# Patient Record
Sex: FEMALE | Race: WHITE | NOT HISPANIC OR LATINO | Employment: FULL TIME | URBAN - METROPOLITAN AREA
[De-identification: names, ages, dates, MRNs, and addresses within clinical notes are randomized per-mention and may not be internally consistent; named-entity substitution may affect disease eponyms.]

---

## 2023-04-29 LAB
THYROTROPIN (MIU/L) IN SER/PLAS BY DETECTION LIMIT <= 0.05 MIU/L: 3.81 MIU/L (ref 0.44–3.98)
THYROXINE (T4) FREE (NG/DL) IN SER/PLAS: 1.13 NG/DL (ref 0.78–1.48)
TRIIODOTHYRONINE (T3) (NG/DL) IN SER/PLAS: 95 NG/DL (ref 60–200)

## 2023-05-12 LAB
CALCIDIOL (25 OH VITAMIN D3) (NG/ML) IN SER/PLAS: 51 NG/ML
THYROPEROXIDASE AB (IU/ML) IN SER/PLAS: <28 IU/ML

## 2023-09-09 PROBLEM — E04.1 THYROID NODULE: Status: ACTIVE | Noted: 2023-09-09

## 2023-09-09 PROBLEM — K21.9 GASTROESOPHAGEAL REFLUX DISEASE: Status: RESOLVED | Noted: 2023-09-09 | Resolved: 2023-09-09

## 2023-09-09 PROBLEM — E55.9 VITAMIN D DEFICIENCY: Status: ACTIVE | Noted: 2023-09-09

## 2023-09-09 PROBLEM — G47.00 INSOMNIA: Status: ACTIVE | Noted: 2023-09-09

## 2023-09-09 PROBLEM — J34.2 DEVIATED NASAL SEPTUM: Status: ACTIVE | Noted: 2023-09-09

## 2023-09-09 PROBLEM — R40.0 DAYTIME SOMNOLENCE: Status: ACTIVE | Noted: 2023-09-09

## 2023-09-09 PROBLEM — G47.33 OBSTRUCTIVE SLEEP APNEA OF ADULT: Status: ACTIVE | Noted: 2023-09-09

## 2023-09-09 PROBLEM — I10 HYPERTENSION: Status: ACTIVE | Noted: 2023-09-09

## 2023-09-09 PROBLEM — E78.5 HYPERLIPIDEMIA: Status: ACTIVE | Noted: 2023-09-09

## 2023-09-09 PROBLEM — R00.2 HEART PALPITATIONS: Status: ACTIVE | Noted: 2023-09-09

## 2023-09-09 PROBLEM — K21.9 GASTROESOPHAGEAL REFLUX DISEASE: Status: ACTIVE | Noted: 2023-09-09

## 2023-09-09 PROBLEM — R49.0 HOARSENESS: Status: ACTIVE | Noted: 2023-09-09

## 2023-09-09 PROBLEM — K21.9 GERD WITHOUT ESOPHAGITIS: Status: ACTIVE | Noted: 2023-09-09

## 2023-09-09 PROBLEM — I10 BENIGN ESSENTIAL HTN: Status: ACTIVE | Noted: 2023-09-09

## 2023-09-09 PROBLEM — F41.9 ANXIETY: Status: ACTIVE | Noted: 2023-09-09

## 2023-09-09 PROBLEM — Z78.0 POSTMENOPAUSAL ESTROGEN DEFICIENCY: Status: ACTIVE | Noted: 2023-09-09

## 2023-09-09 PROBLEM — E04.9 GOITER: Status: ACTIVE | Noted: 2023-09-09

## 2023-09-09 PROBLEM — R91.8 LUNG NODULES: Status: ACTIVE | Noted: 2023-09-09

## 2023-09-09 PROBLEM — N95.1 FEMALE CLIMACTERIC: Status: ACTIVE | Noted: 2023-09-09

## 2023-09-09 PROBLEM — R00.0 TACHYCARDIA: Status: ACTIVE | Noted: 2023-09-09

## 2023-09-09 PROBLEM — M85.88 OSTEOPENIA OF LUMBAR SPINE: Status: ACTIVE | Noted: 2023-09-09

## 2023-09-09 RX ORDER — LORAZEPAM 1 MG/1
1 TABLET ORAL EVERY 12 HOURS PRN
COMMUNITY

## 2023-09-09 RX ORDER — PAROXETINE HYDROCHLORIDE 20 MG/1
1 TABLET, FILM COATED ORAL DAILY
COMMUNITY
Start: 2019-02-04

## 2023-09-09 RX ORDER — ERGOCALCIFEROL 1.25 MG/1
1 CAPSULE ORAL
COMMUNITY
Start: 2019-02-05

## 2023-09-09 RX ORDER — CHOLECALCIFEROL (VITAMIN D3) 50 MCG
1 TABLET ORAL EVERY 24 HOURS
COMMUNITY

## 2023-09-09 RX ORDER — PAROXETINE 10 MG/1
1 TABLET, FILM COATED ORAL EVERY MORNING
COMMUNITY

## 2023-09-09 RX ORDER — IRBESARTAN 150 MG/1
150 TABLET ORAL DAILY
COMMUNITY
Start: 2023-06-08 | End: 2024-01-08

## 2023-09-09 RX ORDER — AMLODIPINE BESYLATE 2.5 MG/1
1 TABLET ORAL EVERY 24 HOURS
COMMUNITY
Start: 2020-07-30

## 2023-10-04 ENCOUNTER — ANCILLARY PROCEDURE (OUTPATIENT)
Dept: RADIOLOGY | Facility: CLINIC | Age: 63
End: 2023-10-04
Payer: COMMERCIAL

## 2023-10-04 DIAGNOSIS — M25.551 PAIN IN RIGHT HIP: ICD-10-CM

## 2023-10-04 PROCEDURE — 73502 X-RAY EXAM HIP UNI 2-3 VIEWS: CPT | Mod: RIGHT SIDE | Performed by: RADIOLOGY

## 2023-10-04 PROCEDURE — 73502 X-RAY EXAM HIP UNI 2-3 VIEWS: CPT | Mod: RT,FY

## 2023-10-12 ENCOUNTER — APPOINTMENT (OUTPATIENT)
Dept: CARDIOLOGY | Facility: CLINIC | Age: 63
End: 2023-10-12
Payer: COMMERCIAL

## 2023-11-25 ENCOUNTER — HOSPITAL ENCOUNTER (OUTPATIENT)
Dept: RADIOLOGY | Facility: EXTERNAL LOCATION | Age: 63
Discharge: HOME | End: 2023-11-25

## 2023-11-25 DIAGNOSIS — M25.572 PAIN IN LEFT ANKLE AND JOINTS OF LEFT FOOT: ICD-10-CM

## 2023-12-21 ENCOUNTER — APPOINTMENT (OUTPATIENT)
Dept: CARDIOLOGY | Facility: CLINIC | Age: 63
End: 2023-12-21
Payer: COMMERCIAL

## 2024-01-07 DIAGNOSIS — I10 HYPERTENSION, UNSPECIFIED TYPE: Primary | ICD-10-CM

## 2024-01-08 RX ORDER — IRBESARTAN 150 MG/1
150 TABLET ORAL DAILY
Qty: 30 TABLET | Refills: 11 | Status: SHIPPED | OUTPATIENT
Start: 2024-01-08 | End: 2024-04-19 | Stop reason: SDUPTHER

## 2024-01-18 ENCOUNTER — APPOINTMENT (OUTPATIENT)
Dept: CARDIOLOGY | Facility: CLINIC | Age: 64
End: 2024-01-18
Payer: COMMERCIAL

## 2024-04-19 ENCOUNTER — APPOINTMENT (OUTPATIENT)
Dept: PRIMARY CARE | Facility: CLINIC | Age: 64
End: 2024-04-19
Payer: MEDICARE

## 2024-04-19 DIAGNOSIS — I10 HYPERTENSION, UNSPECIFIED TYPE: ICD-10-CM

## 2024-04-19 RX ORDER — IRBESARTAN 150 MG/1
150 TABLET ORAL DAILY
Qty: 100 TABLET | Refills: 0 | Status: SHIPPED | OUTPATIENT
Start: 2024-04-19

## 2024-04-19 NOTE — TELEPHONE ENCOUNTER
Patient is scheduled for May but needs refills.  Irbesartan 150mg takes one a day.  #100 (per new insurance)  [facility]

## 2024-05-30 ENCOUNTER — APPOINTMENT (OUTPATIENT)
Dept: PRIMARY CARE | Facility: CLINIC | Age: 64
End: 2024-05-30
Payer: COMMERCIAL

## 2024-08-01 ENCOUNTER — APPOINTMENT (OUTPATIENT)
Dept: PRIMARY CARE | Facility: CLINIC | Age: 64
End: 2024-08-01
Payer: MEDICARE

## 2024-08-01 VITALS
SYSTOLIC BLOOD PRESSURE: 121 MMHG | OXYGEN SATURATION: 97 % | TEMPERATURE: 97.8 F | WEIGHT: 164.2 LBS | HEIGHT: 67 IN | DIASTOLIC BLOOD PRESSURE: 76 MMHG | HEART RATE: 82 BPM | BODY MASS INDEX: 25.77 KG/M2

## 2024-08-01 DIAGNOSIS — K44.9 HIATAL HERNIA: ICD-10-CM

## 2024-08-01 DIAGNOSIS — E55.9 VITAMIN D DEFICIENCY: ICD-10-CM

## 2024-08-01 DIAGNOSIS — I10 HYPERTENSION, UNSPECIFIED TYPE: ICD-10-CM

## 2024-08-01 DIAGNOSIS — K21.9 GASTROESOPHAGEAL REFLUX DISEASE, UNSPECIFIED WHETHER ESOPHAGITIS PRESENT: ICD-10-CM

## 2024-08-01 DIAGNOSIS — Z00.00 ENCOUNTER FOR PREVENTIVE HEALTH EXAMINATION: Primary | ICD-10-CM

## 2024-08-01 PROCEDURE — 3074F SYST BP LT 130 MM HG: CPT | Performed by: FAMILY MEDICINE

## 2024-08-01 PROCEDURE — 1159F MED LIST DOCD IN RCRD: CPT | Performed by: FAMILY MEDICINE

## 2024-08-01 PROCEDURE — 3078F DIAST BP <80 MM HG: CPT | Performed by: FAMILY MEDICINE

## 2024-08-01 PROCEDURE — 99397 PER PM REEVAL EST PAT 65+ YR: CPT | Performed by: FAMILY MEDICINE

## 2024-08-01 PROCEDURE — 1036F TOBACCO NON-USER: CPT | Performed by: FAMILY MEDICINE

## 2024-08-01 PROCEDURE — 3008F BODY MASS INDEX DOCD: CPT | Performed by: FAMILY MEDICINE

## 2024-08-01 PROCEDURE — 1126F AMNT PAIN NOTED NONE PRSNT: CPT | Performed by: FAMILY MEDICINE

## 2024-08-01 PROCEDURE — G0402 INITIAL PREVENTIVE EXAM: HCPCS | Performed by: FAMILY MEDICINE

## 2024-08-01 PROCEDURE — 1170F FXNL STATUS ASSESSED: CPT | Performed by: FAMILY MEDICINE

## 2024-08-01 RX ORDER — SUCRALFATE 1 G/1
1 TABLET ORAL
Qty: 120 TABLET | Refills: 2 | Status: SHIPPED | OUTPATIENT
Start: 2024-08-01 | End: 2024-10-30

## 2024-08-01 RX ORDER — PANTOPRAZOLE SODIUM 40 MG/1
40 TABLET, DELAYED RELEASE ORAL DAILY
Qty: 30 TABLET | Refills: 1 | Status: SHIPPED | OUTPATIENT
Start: 2024-08-01 | End: 2024-09-30

## 2024-08-01 RX ORDER — IRBESARTAN 150 MG/1
150 TABLET ORAL DAILY
Qty: 100 TABLET | Refills: 2 | Status: SHIPPED | OUTPATIENT
Start: 2024-08-01

## 2024-08-01 RX ORDER — PAROXETINE 30 MG/1
1 TABLET, FILM COATED ORAL
COMMUNITY
Start: 2024-07-11

## 2024-08-01 ASSESSMENT — COLUMBIA-SUICIDE SEVERITY RATING SCALE - C-SSRS
6. HAVE YOU EVER DONE ANYTHING, STARTED TO DO ANYTHING, OR PREPARED TO DO ANYTHING TO END YOUR LIFE?: NO
2. HAVE YOU ACTUALLY HAD ANY THOUGHTS OF KILLING YOURSELF?: NO
1. IN THE PAST MONTH, HAVE YOU WISHED YOU WERE DEAD OR WISHED YOU COULD GO TO SLEEP AND NOT WAKE UP?: NO

## 2024-08-01 ASSESSMENT — PATIENT HEALTH QUESTIONNAIRE - PHQ9
2. FEELING DOWN, DEPRESSED OR HOPELESS: NOT AT ALL
1. LITTLE INTEREST OR PLEASURE IN DOING THINGS: MORE THAN HALF THE DAYS
10. IF YOU CHECKED OFF ANY PROBLEMS, HOW DIFFICULT HAVE THESE PROBLEMS MADE IT FOR YOU TO DO YOUR WORK, TAKE CARE OF THINGS AT HOME, OR GET ALONG WITH OTHER PEOPLE: NOT DIFFICULT AT ALL
SUM OF ALL RESPONSES TO PHQ9 QUESTIONS 1 AND 2: 2

## 2024-08-01 ASSESSMENT — ACTIVITIES OF DAILY LIVING (ADL)
DOING_HOUSEWORK: INDEPENDENT
MANAGING_FINANCES: INDEPENDENT
TAKING_MEDICATION: INDEPENDENT
DRESSING: INDEPENDENT
GROCERY_SHOPPING: INDEPENDENT
BATHING: INDEPENDENT

## 2024-08-01 ASSESSMENT — ENCOUNTER SYMPTOMS
DEPRESSION: 0
OCCASIONAL FEELINGS OF UNSTEADINESS: 0
LOSS OF SENSATION IN FEET: 0

## 2024-08-01 ASSESSMENT — PAIN SCALES - GENERAL: PAINLEVEL: 0-NO PAIN

## 2024-08-01 NOTE — ASSESSMENT & PLAN NOTE
Will prescribe protonix 40mg and carafate 1g as her symptoms have been progressively worsening. Advised her to let us know if symptoms do not improve.

## 2024-08-01 NOTE — PROGRESS NOTES
Subjective   Reason for Visit: Radha Gold is an 64 y.o. female here for a medicare wellness exam (Blood work porders) and Med Refill.     Past Medical, Surgical, and Family History reviewed and updated in chart.    Reviewed all medications by prescribing practitioner or clinical pharmacist (such as prescriptions, OTCs, herbal therapies and supplements) and documented in the medical record.    HPI  1.  Medicare wellness/physical exam.  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 GERD, hiatal hernia  Worsening reflux  Was recommended to have EGD 3 years ago  Takes nexium OTC    3. Groin pain  Right groin pain for 3 years  Quick movements cause sharp pain  Has seen Dr. Evans in the past     4. HLD  Dr. Joiner  Previous lipid panel with  and Tchol 295  CT calcium score in 2019 was 0    5. Thyroid nodules  Dr. Evans  Following up in September     Review of Systems  All pertinent positive symptoms are included in the history of present illness.    All other systems have been reviewed and are negative and noncontributory to this patient's current ailments.    History reviewed. No pertinent past medical history.  Past Surgical History:   Procedure Laterality Date    OTHER SURGICAL HISTORY  01/17/2019    Shoulder surgery     Social History     Tobacco Use    Smoking status: Never    Smokeless tobacco: Never   Vaping Use    Vaping status: Never Used   Substance Use Topics    Alcohol use: Not Currently    Drug use: Never     Family History   Problem Relation Name Age of Onset    Hyperlipidemia Mother      Coronary artery disease Father      Hyperlipidemia Sister      Hyperlipidemia Brother       Allergies   Allergen Reactions    Adhesive Tape-Silicones Rash     Immunization History   Administered Date(s) Administered    Flu vaccine (IIV4), preservative free *Check age/dose* 09/12/2020, 12/07/2022     "Flu vaccine, quadrivalent, recombinant, preservative free, adult (FLUBLOK) 09/13/2020    Influenza, injectable, MDCK, quadrivalent 10/29/2019    Moderna SARS-CoV-2 Vaccination 01/21/2021, 01/22/2021, 02/19/2021, 11/01/2021     Current Outpatient Medications   Medication Instructions    cholecalciferol (Vitamin D-3) 50 MCG (2000 UT) tablet 1 tablet, oral, Every 24 hours    cyanocobalamin, vitamin B-12, (VITAMIN B-12 ORAL) No dose, route, or frequency recorded.    ergocalciferol (Vitamin D-2) 1.25 MG (78810 UT) capsule 1 capsule, oral, Once Weekly    irbesartan (AVAPRO) 150 mg, oral, Daily    LORazepam (ATIVAN) 1 mg, oral, Every 12 hours PRN    pantoprazole (PROTONIX) 40 mg, oral, Daily, Do not crush, chew, or split.    PARoxetine (PaxiL) 10 mg tablet 1 tablet, oral, Every morning    PARoxetine (Paxil) 20 mg tablet 1 tablet, oral, Daily    PARoxetine (Paxil) 30 mg tablet 1 tablet, oral, Daily (0630)    sucralfate (CARAFATE) 1 g, oral, 4 times daily before meals and nightly       Patient Self Assessment of Health Status  Patient Self Assessment: Good    Nutrition and Exercise  Current Diet: Unhealthy Diet  Adequate Fluid Intake: Yes  Caffeine: Yes  Exercise Frequency: No Exercise    Functional Ability/Level of Safety  Cognitive Impairment Observed: No cognitive impairment observed  Cognitive Impairment Reported: No cognitive impairment reported by patient or family    Home Safety Risk Factors: None    Objective   Visit Vitals  /76   Pulse 82   Temp 36.6 °C (97.8 °F)   Ht 1.689 m (5' 6.5\")   Wt 74.5 kg (164 lb 3.2 oz)   SpO2 97%   BMI 26.11 kg/m²   Smoking Status Never   BSA 1.87 m²      No visits with results within 1 Month(s) from this visit.   Latest known visit with results is:   [facility] Encounter on 06/07/2023   Component Date Value    CONVERTED FINAL DIAGNOSIS 06/07/2023                      Value:  1. THYROID GLAND, RIGHT LOBE, FINE-NEEDLE ASPIRATION (1 THIN PREP AND  CELL BLOCK):    ADEQUACY:     ADEQUATE " FOR DIAGNOSIS    CATEGORY:     BENIGN             (BETHESDA SYSTEM FOR THYROID CYTOLOGY CATEGORY 2)    COMMENT:     The specimen includes relatively uniform follicular cells,  some arranged in spherules.    2. THYROID GLAND, LEFT ISTHMUS, FINE-NEEDLE ASPIRATION (1 THIN PREP AND  CELL BLOCK):    ADEQUACY:     UNSATISFACTORY FOR DIAGNOSIS    CATEGORY:     NON-DIAGNOSTIC / UNSATISFACTORY FOR DIAGNOSIS            (BETHESDA SYSTEM FOR THYROID CYTOLOGY CATEGORY 1)    COMMENT: The specimen is unsatisfactory for interpretation due to  insufficient cellularity.      CONSULTATION WITH CONCURRENCE: JOSEPH LUNA M.D.  06/15/2023 14:36  [facility] - 06/08/2023      CONVERTED CLINICAL DIAGN* 06/07/2023 E04.1 - Thyroid nodule, Right and Left isthmus     CONVERTED GROSS DESCRIPT* 06/07/2023                      Value:1. The specimen consists of ~ 30 ml of blood-tinged CytoLyt solution.   One ThinPrep slide and one cell block are prepared. Also received is ~  10 ml FNAprotect solution, acquired and held for possible molecular  testing.    2. The specimen consists of ~ 30 ml of clear CytoLyt solution.  One  ThinPrep slide and one cell block are prepared. Also received is ~ 10  ml FNAprotect solution, acquired and held for possible molecular  testing.    A portion of the technical component was performed at [facility], [address] [place] [address].  CLIA number  [id number].  This information is included on the report as part of a  CLIA requirement.          CONVERTED MICROSCOPIC DE* 06/07/2023                      Value:Slides examined; description omitted.    The above diagnosis was rendered at [facility], [address] [place] [address]. [address], [address] [place] [address], Alice Kendall  & Fabrizio CLIA number [id number].  This information is included on  the report as part of a CLIA requirement.       The 10-year ASCVD risk score (Edvin DK, et al., 2019) is:  7.8%    Values used to calculate the score:      Age: 64 years      Sex: Female      Is Non- : No      Diabetic: No      Tobacco smoker: No      Systolic Blood Pressure: 121 mmHg      Is BP treated: Yes      HDL Cholesterol: 61 MG/DL      Total Cholesterol: 295 MG/DL    Physical Exam  CONSTITUTIONAL - well nourished, well developed, looks like stated age, in no acute distress, not ill-appearing, and not tired appearing  SKIN - normal skin color and pigmentation, normal skin turgor without rash, lesions, or nodules visualized  HEAD - no trauma, normocephalic  EYES - pupils are equal and reactive to light, extraocular muscles are intact, and normal external exam  ENT - TM's intact, no injection, no signs of infection, uvula midline, normal tongue movement and throat normal, no exudate, nasal passage without discharge and patent  NECK - supple without rigidity, no neck mass was observed, no thyromegaly or thyroid nodules  CHEST - clear to auscultation, no wheezing, no crackles and no rales, good effort  CARDIAC - regular rate and regular rhythm, no skipped beats, no murmur  ABDOMEN - no organomegaly, soft, nontender, nondistended, normal bowel sounds  EXTREMITIES - no obvious or evident edema, no obvious or evident deformities  NEUROLOGICAL - alert, oriented and no focal signs  PSYCHIATRIC - alert, pleasant and cordial, age-appropriate  IMMUNOLOGIC - no cervical lymphadenopathy    Assessment/Plan   Problem List Items Addressed This Visit       Gastroesophageal reflux disease    Current Assessment & Plan     Will prescribe protonix 40mg and carafate 1g as her symptoms have been progressively worsening. Advised her to let us know if symptoms do not improve.          Relevant Medications    pantoprazole (ProtoNix) 40 mg EC tablet    sucralfate (Carafate) 1 gram tablet    Hypertension    Current Assessment & Plan     Stable, continue medication.         Relevant Medications    irbesartan (Avapro)  150 mg tablet    Vitamin D deficiency    Current Assessment & Plan     History of vit d deficiency. Will follow up with results.          Relevant Orders    Vitamin D 25-Hydroxy,Total (for eval of Vitamin D levels)    Encounter for preventive health examination - Primary    Current Assessment & Plan     Complete history and physical examination was performed  Mammogram due, but patient will defer at this time and let us know when she is ready to schedule  Colonoscopy due, patient will see gen surg regarding hiatal hernia and hopefully have colonoscopy done at same time as EGD  We will notify of test results once available and make treatment recommendations accordingly           Relevant Orders    CBC    Comprehensive Metabolic Panel    Hemoglobin A1C    Lipid Panel    TSH with reflex to Free T4 if abnormal    Vitamin D 25-Hydroxy,Total (for eval of Vitamin D levels)    Hiatal hernia    Current Assessment & Plan     Has previously seen GI who recommended EGD and colonoscopy, but patient was unable to get this set up due to life circumstances. We will place a referral to Dr. Thorpe as her symptoms are progressing.          Relevant Medications    pantoprazole (ProtoNix) 40 mg EC tablet    Other Relevant Orders    Referral to General Surgery     Other Visit Diagnoses       BMI 26.0-26.9,adult        Relevant Orders    CBC          Patient Care Team:  Adiel Griffith DO as PCP - General  Adiel Griffith DO as PCP - [employer] PCP

## 2024-08-01 NOTE — ASSESSMENT & PLAN NOTE
Has previously seen GI who recommended EGD and colonoscopy, but patient was unable to get this set up due to life circumstances. We will place a referral to Dr. Thorpe as her symptoms are progressing.

## 2024-08-01 NOTE — ASSESSMENT & PLAN NOTE
Complete history and physical examination was performed  Mammogram due, but patient will defer at this time and let us know when she is ready to schedule  Colonoscopy due, patient will see gen surg regarding hiatal hernia and hopefully have colonoscopy done at same time as EGD  We will notify of test results once available and make treatment recommendations accordingly

## 2024-08-02 NOTE — PROGRESS NOTES
I reviewed and examined the patient. I was present for the key exam elements, and I fully participated in the patient's care. I discussed the management of the care with the resident. I have personally reviewed the pertinent labs and imaging, as well as recent notes, with the patient. I have reviewed the note above and agree with the resident's medical decision making as documented in the resident's note, in addition to the following comments / findings:     Agree with the rest of the plan outlined below by resident physician. No red flags.      The patient understands and agrees to the assessment and plan of care. Patient has also agreed to follow up and comply with the treatment and evaluation as recommended today. Patient was instructed to call the office at [phone] should questions arise regarding their treatment or care.     Adiel Griffith DO, FAOASM  Family Medicine   [address], [address]  [place] [address]     Adiel Griffith DO

## 2024-08-12 ENCOUNTER — TELEPHONE (OUTPATIENT)
Dept: PRIMARY CARE | Facility: CLINIC | Age: 64
End: 2024-08-12
Payer: MEDICARE

## 2024-08-12 DIAGNOSIS — Z12.11 COLON CANCER SCREENING: Primary | ICD-10-CM

## 2024-08-12 NOTE — TELEPHONE ENCOUNTER
Patient called stated she looked into Dr. Thorpe and she only does certain procedures out of [facility] the rest are out of [facility].  Can she have  a new referral put in for someone else.?

## 2024-08-13 DIAGNOSIS — K44.9 HIATAL HERNIA: Primary | ICD-10-CM

## 2024-08-19 ENCOUNTER — LAB (OUTPATIENT)
Dept: LAB | Facility: LAB | Age: 64
End: 2024-08-19
Payer: MEDICARE

## 2024-08-19 DIAGNOSIS — E55.9 VITAMIN D DEFICIENCY: ICD-10-CM

## 2024-08-19 DIAGNOSIS — Z00.00 ENCOUNTER FOR PREVENTIVE HEALTH EXAMINATION: ICD-10-CM

## 2024-08-19 LAB
25(OH)D3 SERPL-MCNC: 33 NG/ML (ref 31–100)
ALBUMIN SERPL-MCNC: 4.5 G/DL (ref 3.5–5)
ALP BLD-CCNC: 116 U/L (ref 35–125)
ALT SERPL-CCNC: 28 U/L (ref 5–40)
ANION GAP SERPL CALC-SCNC: 12 MMOL/L
AST SERPL-CCNC: 25 U/L (ref 5–40)
BILIRUB SERPL-MCNC: 0.5 MG/DL (ref 0.1–1.2)
BUN SERPL-MCNC: 20 MG/DL (ref 8–25)
CALCIUM SERPL-MCNC: 9.8 MG/DL (ref 8.5–10.4)
CHLORIDE SERPL-SCNC: 104 MMOL/L (ref 97–107)
CHOLEST SERPL-MCNC: 340 MG/DL (ref 133–200)
CHOLEST/HDLC SERPL: 6.3 {RATIO}
CO2 SERPL-SCNC: 27 MMOL/L (ref 24–31)
CREAT SERPL-MCNC: 1 MG/DL (ref 0.4–1.6)
EGFRCR SERPLBLD CKD-EPI 2021: 63 ML/MIN/1.73M*2
ERYTHROCYTE [DISTWIDTH] IN BLOOD BY AUTOMATED COUNT: 13.4 % (ref 11.5–14.5)
EST. AVERAGE GLUCOSE BLD GHB EST-MCNC: 128 MG/DL
GLUCOSE SERPL-MCNC: 95 MG/DL (ref 65–99)
HBA1C MFR BLD: 6.1 %
HCT VFR BLD AUTO: 43.9 % (ref 36–46)
HDLC SERPL-MCNC: 54 MG/DL
HGB BLD-MCNC: 14.4 G/DL (ref 12–16)
LDLC SERPL CALC-MCNC: 256 MG/DL (ref 65–130)
MCH RBC QN AUTO: 28.9 PG (ref 26–34)
MCHC RBC AUTO-ENTMCNC: 32.8 G/DL (ref 32–36)
MCV RBC AUTO: 88 FL (ref 80–100)
NRBC BLD-RTO: 0 /100 WBCS (ref 0–0)
PLATELET # BLD AUTO: 290 X10*3/UL (ref 150–450)
POTASSIUM SERPL-SCNC: 4.7 MMOL/L (ref 3.4–5.1)
PROT SERPL-MCNC: 7.1 G/DL (ref 5.9–7.9)
RBC # BLD AUTO: 4.99 X10*6/UL (ref 4–5.2)
SODIUM SERPL-SCNC: 143 MMOL/L (ref 133–145)
TRIGL SERPL-MCNC: 150 MG/DL (ref 40–150)
TSH SERPL DL<=0.05 MIU/L-ACNC: 3.05 MIU/L (ref 0.27–4.2)
WBC # BLD AUTO: 6.5 X10*3/UL (ref 4.4–11.3)

## 2024-08-19 PROCEDURE — 83036 HEMOGLOBIN GLYCOSYLATED A1C: CPT

## 2024-08-19 PROCEDURE — 82306 VITAMIN D 25 HYDROXY: CPT

## 2024-08-19 PROCEDURE — 80053 COMPREHEN METABOLIC PANEL: CPT

## 2024-08-19 PROCEDURE — 36415 COLL VENOUS BLD VENIPUNCTURE: CPT

## 2024-08-19 PROCEDURE — 85027 COMPLETE CBC AUTOMATED: CPT

## 2024-08-19 PROCEDURE — 80061 LIPID PANEL: CPT

## 2024-08-19 PROCEDURE — 84443 ASSAY THYROID STIM HORMONE: CPT

## 2024-08-29 ENCOUNTER — TELEPHONE (OUTPATIENT)
Dept: PRIMARY CARE | Facility: CLINIC | Age: 64
End: 2024-08-29
Payer: MEDICARE

## 2024-08-29 NOTE — TELEPHONE ENCOUNTER
Patient did see her lab results. Had some questions about results and asked about Ozempic.  I did tell her some of those meds aren't covered and she asked when you prescribe.  I did tell her I wasn't sure how much overweight one had to be for you to prescribe.

## 2024-09-06 ENCOUNTER — APPOINTMENT (OUTPATIENT)
Dept: SURGERY | Facility: CLINIC | Age: 64
End: 2024-09-06
Payer: MEDICARE

## 2024-09-06 VITALS
WEIGHT: 166.1 LBS | HEIGHT: 66 IN | BODY MASS INDEX: 26.69 KG/M2 | SYSTOLIC BLOOD PRESSURE: 147 MMHG | HEART RATE: 74 BPM | DIASTOLIC BLOOD PRESSURE: 82 MMHG | OXYGEN SATURATION: 97 %

## 2024-09-06 DIAGNOSIS — K44.9 HIATAL HERNIA: Primary | ICD-10-CM

## 2024-09-06 PROCEDURE — 3077F SYST BP >= 140 MM HG: CPT | Performed by: SURGERY

## 2024-09-06 PROCEDURE — 99204 OFFICE O/P NEW MOD 45 MIN: CPT | Performed by: SURGERY

## 2024-09-06 PROCEDURE — 3008F BODY MASS INDEX DOCD: CPT | Performed by: SURGERY

## 2024-09-06 PROCEDURE — 3079F DIAST BP 80-89 MM HG: CPT | Performed by: SURGERY

## 2024-09-06 NOTE — PROGRESS NOTES
General Surgery History and Physical    Referring Provider:  DO Nacho Matt Louis D, DO     Chief Complaint:  Hiatal hernia    History of Present Illness:  This is a 64 y.o. female who presents with complaints of a hiatal hernia.  She reports that a few years ago she had some imaging done at an outside institution for spine issues that demonstrated a hiatal hernia.  However, at that time, she had only very minimal intermittent heartburn symptoms.  However, over the past year or 2, she has had increasing heartburn.  Omeprazole was no longer working for her.  She also reports significant nearly daily burning pain at the base of her sternum, but deep inside.  She also reports that occasionally it feels like food gets stuck in the same location.  She is often waking up with a sensation of needing to vomit.  She does still drink 2 to 3 cups of coffee a day.  She also eats a large meal in the evening relatively close to bedtime.    Past Medical History:  Hyperlipidemia  Hypertension  Gastroesophageal reflux disease  Anxiety    Past Surgical History:  Shoulder surgery  Open reduction internal fixation of wrist  Knee surgery x 2    Medications:  Carafate  Pantoprazole  Multivitamins  Irbesartan  Lorazepam  Paxil    Allergies:  Tape    Family History:  Hyperlipidemia  Coronary artery disease  Kidney disease  Multiple sclerosis  Myocardial infarction    Social History:  Single.  Retired.  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  Denies tobacco, alcohol, and illicits.       Review of Systems:  A complete 12 point review of systems was performed and is negative except as noted in the history of present illness.      Vital Signs:  Vitals:    09/06/24 1114   BP: 147/82   Pulse: 74   SpO2: 97%          Physical Exam:  General: No acute distress. Sitting up in bed.   Neuro: Alert and oriented ×3. Follows commands.  Head: Atraumatic  Eyes: Pupils equal reactive to light. Extraocular motions intact.  Ears: Hears normal speaking  voice.  Mouth, Nose, Throat: Mucous membranes moist.  Normal dentition.  Neck: Supple. No appreciable masses.  Chest: No crepitus.  No appreciable scars.  Heart: Regular rate and rhythm.  Lung: Clear to auscultation bilaterally.  Vascular: No carotid bruits.  Palpable radial pulses bilaterally.  Abdomen: Soft. Nondistended. Nontender.  No appreciable hernias or scars.  Musculoskeletal: Moves all extremities.  Normal range of motion.  Lymphatic: No palpable lymph nodes.  Skin: No rashes or lesions.  Psychological: Normal affect      Laboratory Values:  CBC:   Lab Results   Component Value Date    WBC 6.5 08/19/2024    RBC 4.99 08/19/2024    HGB 14.4 08/19/2024    HCT 43.9 08/19/2024     08/19/2024       RFP:   Lab Results   Component Value Date     08/19/2024    K 4.7 08/19/2024     08/19/2024    CO2 27 08/19/2024    BUN 20 08/19/2024    CREATININE 1.00 08/19/2024    CALCIUM 9.8 08/19/2024        LFTs:   Lab Results   Component Value Date    PROT 7.1 08/19/2024    ALBUMIN 4.5 08/19/2024    BILITOT 0.5 08/19/2024    ALKPHOS 116 08/19/2024    AST 25 08/19/2024    ALT 28 08/19/2024            Imaging:   None      Assessment:  This is a 64 y.o. female who presents with gastroesophageal reflux disease with reports of a prior unrelated image demonstrating a hiatal hernia.  We discussed that her symptoms are very classic for a hiatal hernia with gastroesophageal reflux disease and some dysphagia.  We discussed that I recommend treating the gastroesophageal reflux disease aggressively, which she has already started.  We discussed that I recommend increasing the dose of the pantoprazole to 40 mg twice daily.  We discussed that we should also proceed with workup of the hiatal hernia.      Plan:  -- EGD with [facility] while being off of the pantoprazole for 1 week prior  -- EMOT  -- Esophagram  -- Avoid caffeine  -- Small frequent meals  -- Be upright for at least 2 hours after eating  -- Finish out the course of  Carafate  -- Increase pantoprazole to 40 mg twice daily  -- Follow-up with me after testing to potentially discuss a laparoscopic hiatal hernia repair with toupee fundoplication.      Saman Anaya MD  General Surgery  Office: [phone]  Fax:     [phone]  11:19 AM   09/06/24

## 2024-09-16 ENCOUNTER — TELEPHONE (OUTPATIENT)
Dept: PRIMARY CARE | Facility: CLINIC | Age: 64
End: 2024-09-16
Payer: MEDICARE

## 2024-09-16 NOTE — TELEPHONE ENCOUNTER
If patient is having fevers/chills, N/V, abdominal pain, diarrhea, or bloody stools, or malaise please advise her to immediately go to the emergency room.

## 2024-09-20 ENCOUNTER — OFFICE VISIT (OUTPATIENT)
Dept: PRIMARY CARE | Facility: CLINIC | Age: 64
End: 2024-09-20
Payer: MEDICARE

## 2024-09-20 VITALS
HEIGHT: 66 IN | SYSTOLIC BLOOD PRESSURE: 118 MMHG | DIASTOLIC BLOOD PRESSURE: 78 MMHG | BODY MASS INDEX: 26.36 KG/M2 | WEIGHT: 164 LBS | OXYGEN SATURATION: 99 % | HEART RATE: 71 BPM

## 2024-09-20 DIAGNOSIS — K21.9 GASTROESOPHAGEAL REFLUX DISEASE, UNSPECIFIED WHETHER ESOPHAGITIS PRESENT: ICD-10-CM

## 2024-09-20 DIAGNOSIS — Z71.85 IMMUNIZATION COUNSELING: ICD-10-CM

## 2024-09-20 DIAGNOSIS — R10.32 LLQ ABDOMINAL PAIN: Primary | ICD-10-CM

## 2024-09-20 DIAGNOSIS — K44.9 HIATAL HERNIA: ICD-10-CM

## 2024-09-20 PROCEDURE — 99214 OFFICE O/P EST MOD 30 MIN: CPT | Performed by: FAMILY MEDICINE

## 2024-09-20 PROCEDURE — 1125F AMNT PAIN NOTED PAIN PRSNT: CPT | Performed by: FAMILY MEDICINE

## 2024-09-20 PROCEDURE — 1036F TOBACCO NON-USER: CPT | Performed by: FAMILY MEDICINE

## 2024-09-20 PROCEDURE — 3078F DIAST BP <80 MM HG: CPT | Performed by: FAMILY MEDICINE

## 2024-09-20 PROCEDURE — 3008F BODY MASS INDEX DOCD: CPT | Performed by: FAMILY MEDICINE

## 2024-09-20 PROCEDURE — 3074F SYST BP LT 130 MM HG: CPT | Performed by: FAMILY MEDICINE

## 2024-09-20 PROCEDURE — 1159F MED LIST DOCD IN RCRD: CPT | Performed by: FAMILY MEDICINE

## 2024-09-20 RX ORDER — PANTOPRAZOLE SODIUM 40 MG/1
40 TABLET, DELAYED RELEASE ORAL 2 TIMES DAILY
Qty: 180 TABLET | Refills: 1 | Status: SHIPPED | OUTPATIENT
Start: 2024-09-20 | End: 2025-03-19

## 2024-09-20 ASSESSMENT — ENCOUNTER SYMPTOMS
OCCASIONAL FEELINGS OF UNSTEADINESS: 0
LOSS OF SENSATION IN FEET: 0
DEPRESSION: 0

## 2024-09-20 ASSESSMENT — PATIENT HEALTH QUESTIONNAIRE - PHQ9
1. LITTLE INTEREST OR PLEASURE IN DOING THINGS: NOT AT ALL
2. FEELING DOWN, DEPRESSED OR HOPELESS: NOT AT ALL
SUM OF ALL RESPONSES TO PHQ9 QUESTIONS 1 AND 2: 0

## 2024-09-20 ASSESSMENT — COLUMBIA-SUICIDE SEVERITY RATING SCALE - C-SSRS
2. HAVE YOU ACTUALLY HAD ANY THOUGHTS OF KILLING YOURSELF?: NO
1. IN THE PAST MONTH, HAVE YOU WISHED YOU WERE DEAD OR WISHED YOU COULD GO TO SLEEP AND NOT WAKE UP?: NO
6. HAVE YOU EVER DONE ANYTHING, STARTED TO DO ANYTHING, OR PREPARED TO DO ANYTHING TO END YOUR LIFE?: NO

## 2024-09-20 ASSESSMENT — PAIN SCALES - GENERAL: PAINLEVEL: 1

## 2024-09-20 NOTE — PROGRESS NOTES
Subjective   Patient ID: Radha Gold is a 64 y.o. female who presents for Diverticulitis.    Past Medical, Surgical, and Family History reviewed and updated in chart.    Reviewed all medications by prescribing practitioner or clinical pharmacist (such as prescriptions, OTCs, herbal therapies and supplements) and documented in the medical record.    HPI  1.  LLQ abdominal pain  Radha is a 64-year-old female who presents with a complaint of left lower quadrant abdominal pain. The initial episode of pain occurred three months ago, characterized by sharp pain that lasted a couple of hours and resolved spontaneously. Most recently, she experienced severe left lower quadrant pain on Saturday, lasting six hours, which nearly prompted a visit to the ER.    The patient denies any episodes of constipation, diarrhea, or hematochezia. She did report some nausea and vomiting during this recent episode. The pain does not seem to be exacerbated by movement or food.    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 She has experienced intermittent diarrhea over the past year and reports feeling like she is straining more than usual during bowel movements. Increased intra-abdominal pressure does not exacerbate her current pain. At present, she describes a dull discomfort in the left lower quadrant but no acute pain.      Review of Systems  All pertinent positive symptoms are included in the history of present illness.    All other systems have been reviewed and are negative and noncontributory to this patient's current ailments.    History reviewed. No pertinent past medical history.  Past Surgical History:   Procedure Laterality Date    OTHER SURGICAL HISTORY  01/17/2019    Shoulder surgery     Social History     Tobacco Use    Smoking status: Never    Smokeless tobacco: Never   Vaping Use    Vaping status: Never Used   Substance Use Topics    Alcohol use: Not Currently  "   Drug use: Never     Family History   Problem Relation Name Age of Onset    Hyperlipidemia Mother      Coronary artery disease Father      Hyperlipidemia Sister      Hyperlipidemia Brother       Immunization History   Administered Date(s) Administered    Flu vaccine (IIV4), preservative free *Check age/dose* 09/12/2020, 12/07/2022    Flu vaccine, quadrivalent, recombinant, preservative free, adult (FLUBLOK) 09/13/2020    Influenza, injectable, MDCK, quadrivalent 10/29/2019    Moderna SARS-CoV-2 Vaccination 01/21/2021, 01/22/2021, 02/19/2021, 11/01/2021     Current Outpatient Medications   Medication Instructions    cholecalciferol (Vitamin D-3) 50 MCG (2000 UT) tablet 1 tablet, oral, Every 24 hours    cyanocobalamin, vitamin B-12, (VITAMIN B-12 ORAL) No dose, route, or frequency recorded.    ergocalciferol (Vitamin D-2) 1.25 MG (72005 UT) capsule 1 capsule, oral, Once Weekly    irbesartan (AVAPRO) 150 mg, oral, Daily    LORazepam (ATIVAN) 1 mg, oral, Every 12 hours PRN    pantoprazole (PROTONIX) 40 mg, oral, 2 times daily, Do not crush, chew, or split. / 2 tablets daily    PARoxetine (PaxiL) 10 mg tablet 1 tablet, oral, Every morning    PARoxetine (Paxil) 20 mg tablet 1 tablet, oral, Daily    PARoxetine (Paxil) 30 mg tablet 1 tablet, oral, Daily (0630)    sucralfate (CARAFATE) 1 g, oral, 4 times daily before meals and nightly     Allergies   Allergen Reactions    Adhesive Tape-Silicones Rash       Objective   Vitals:    09/20/24 1044   BP: 118/78   Pulse: 71   SpO2: 99%   Weight: 74.4 kg (164 lb)   Height: 1.676 m (5' 6\")     Body mass index is 26.47 kg/m².    BP Readings from Last 3 Encounters:   09/20/24 118/78   09/06/24 147/82   08/01/24 121/76      Wt Readings from Last 3 Encounters:   09/20/24 74.4 kg (164 lb)   09/06/24 75.3 kg (166 lb 1.6 oz)   08/01/24 74.5 kg (164 lb 3.2 oz)        No visits with results within 1 Month(s) from this visit.   Latest known visit with results is:   Lab on 08/19/2024 "   Component Date Value    WBC 08/19/2024 6.5     nRBC 08/19/2024 0.0     RBC 08/19/2024 4.99     Hemoglobin 08/19/2024 14.4     Hematocrit 08/19/2024 43.9     MCV 08/19/2024 88     MCH 08/19/2024 28.9     MCHC 08/19/2024 32.8     RDW 08/19/2024 13.4     Platelets 08/19/2024 290     Glucose 08/19/2024 95     Sodium 08/19/2024 143     Potassium 08/19/2024 4.7     Chloride 08/19/2024 104     Bicarbonate 08/19/2024 27     Urea Nitrogen 08/19/2024 20     Creatinine 08/19/2024 1.00     eGFR 08/19/2024 63     Calcium 08/19/2024 9.8     Albumin 08/19/2024 4.5     Alkaline Phosphatase 08/19/2024 116     Total Protein 08/19/2024 7.1     AST 08/19/2024 25     Bilirubin, Total 08/19/2024 0.5     ALT 08/19/2024 28     Anion Gap 08/19/2024 12     Hemoglobin A1C 08/19/2024 6.1 (H)     Estimated Average Glucose 08/19/2024 128     Cholesterol 08/19/2024 340 (H)     HDL-Cholesterol 08/19/2024 54.0     Cholesterol/HDL Ratio 08/19/2024 6.3     LDL Calculated 08/19/2024 256 (H)     Triglycerides 08/19/2024 150     Thyroid Stimulating Horm* 08/19/2024 3.05     Vitamin D, 25-Hydroxy, T* 08/19/2024 33      Physical Exam  CONSTITUTIONAL - well nourished, well developed, looks like stated age, in no acute distress, not ill-appearing, and not tired appearing  SKIN - normal skin color and pigmentation, normal skin turgor without rash, lesions, or nodules visualized  HEAD - no trauma, normocephalic  EYES - extraocular muscles are intact, and normal external exam  CHEST - good effort  CARDIAC - regular rate and regular rhythm, no skipped beats, no murmur  ABDOMEN - Mild TTP LLQ. no organomegaly, soft, non-distended, normal bowel sounds, no guarding/rebound/rigidity, negative McBurney sign and negative Landa sign  EXTREMITIES - no obvious or evident edema, no obvious or evident deformities  NEUROLOGICAL - alert, oriented and no focal signs  PSYCHIATRIC - alert, pleasant and cordial, age-appropriate  IMMUNOLOGIC - no cervical  lymphadenopathy    Assessment/Plan   I will follow up with imaging results. No red flags.     Problem List Items Addressed This Visit       Gastroesophageal reflux disease    Relevant Medications    pantoprazole (ProtoNix) 40 mg EC tablet    Hiatal hernia    Relevant Medications    pantoprazole (ProtoNix) 40 mg EC tablet    LLQ abdominal pain - Primary     Radha presents today with left lower quadrant abdominal pain. History and exam are concerning for diverticulitis vs hernia. Ovarian torsion is also in the differential, though I consider it less likely. Can also be an abdominal muscle strain.    I will order a CT scan of the abdomen and pelvis to rule out any abdominal or pelvic pathology. Additionally, the patient is scheduling a colonoscopy in the next 1-2 months.    I will follow up with the patient once the imaging results are available.         Relevant Orders    Comprehensive metabolic panel    CBC and Auto Differential    Amylase    Lipase    CT abdomen pelvis w IV contrast     Other Visit Diagnoses       Immunization counseling              Patient was staffed with attending physician Dr. Hicks.  Rach Zee, DO  Family Medicine, PGY-3

## 2024-09-20 NOTE — ASSESSMENT & PLAN NOTE
Radha presents today with left lower quadrant abdominal pain. History and exam are concerning for diverticulitis vs hernia. Ovarian torsion is also in the differential, though I consider it less likely. Can also be an abdominal muscle strain.    I will order a CT scan of the abdomen and pelvis to rule out any abdominal or pelvic pathology. Additionally, the patient is scheduling a colonoscopy in the next 1-2 months.    I will follow up with the patient once the imaging results are available.

## 2024-09-24 ENCOUNTER — LAB (OUTPATIENT)
Dept: LAB | Facility: LAB | Age: 64
End: 2024-09-24
Payer: MEDICARE

## 2024-09-24 DIAGNOSIS — R10.32 LLQ ABDOMINAL PAIN: ICD-10-CM

## 2024-09-24 LAB
ALBUMIN SERPL BCP-MCNC: 4.5 G/DL (ref 3.4–5)
ALP SERPL-CCNC: 87 U/L (ref 33–136)
ALT SERPL W P-5'-P-CCNC: 20 U/L (ref 7–45)
AMYLASE SERPL-CCNC: 72 U/L (ref 29–103)
ANION GAP SERPL CALCULATED.3IONS-SCNC: 11 MMOL/L (ref 10–20)
AST SERPL W P-5'-P-CCNC: 19 U/L (ref 9–39)
BASOPHILS # BLD AUTO: 0.08 X10*3/UL (ref 0–0.1)
BASOPHILS NFR BLD AUTO: 1.4 %
BILIRUB SERPL-MCNC: 0.7 MG/DL (ref 0–1.2)
BUN SERPL-MCNC: 18 MG/DL (ref 6–23)
CALCIUM SERPL-MCNC: 9.5 MG/DL (ref 8.6–10.3)
CHLORIDE SERPL-SCNC: 104 MMOL/L (ref 98–107)
CO2 SERPL-SCNC: 30 MMOL/L (ref 21–32)
CREAT SERPL-MCNC: 0.94 MG/DL (ref 0.5–1.05)
EGFRCR SERPLBLD CKD-EPI 2021: 67 ML/MIN/1.73M*2
EOSINOPHIL # BLD AUTO: 0.12 X10*3/UL (ref 0–0.7)
EOSINOPHIL NFR BLD AUTO: 2.1 %
ERYTHROCYTE [DISTWIDTH] IN BLOOD BY AUTOMATED COUNT: 13.4 % (ref 11.5–14.5)
GLUCOSE SERPL-MCNC: 91 MG/DL (ref 74–99)
HCT VFR BLD AUTO: 44.7 % (ref 36–46)
HGB BLD-MCNC: 14.9 G/DL (ref 12–16)
IMM GRANULOCYTES # BLD AUTO: 0.01 X10*3/UL (ref 0–0.7)
IMM GRANULOCYTES NFR BLD AUTO: 0.2 % (ref 0–0.9)
LIPASE SERPL-CCNC: 49 U/L (ref 9–82)
LYMPHOCYTES # BLD AUTO: 1.68 X10*3/UL (ref 1.2–4.8)
LYMPHOCYTES NFR BLD AUTO: 29.9 %
MCH RBC QN AUTO: 29.5 PG (ref 26–34)
MCHC RBC AUTO-ENTMCNC: 33.3 G/DL (ref 32–36)
MCV RBC AUTO: 89 FL (ref 80–100)
MONOCYTES # BLD AUTO: 0.47 X10*3/UL (ref 0.1–1)
MONOCYTES NFR BLD AUTO: 8.4 %
NEUTROPHILS # BLD AUTO: 3.25 X10*3/UL (ref 1.2–7.7)
NEUTROPHILS NFR BLD AUTO: 58 %
NRBC BLD-RTO: 0 /100 WBCS (ref 0–0)
PLATELET # BLD AUTO: 319 X10*3/UL (ref 150–450)
POTASSIUM SERPL-SCNC: 4.6 MMOL/L (ref 3.5–5.3)
PROT SERPL-MCNC: 6.8 G/DL (ref 6.4–8.2)
RBC # BLD AUTO: 5.05 X10*6/UL (ref 4–5.2)
SODIUM SERPL-SCNC: 140 MMOL/L (ref 136–145)
WBC # BLD AUTO: 5.6 X10*3/UL (ref 4.4–11.3)

## 2024-09-24 PROCEDURE — 80053 COMPREHEN METABOLIC PANEL: CPT

## 2024-09-24 PROCEDURE — 83690 ASSAY OF LIPASE: CPT

## 2024-09-24 PROCEDURE — 36415 COLL VENOUS BLD VENIPUNCTURE: CPT

## 2024-09-24 PROCEDURE — 82150 ASSAY OF AMYLASE: CPT

## 2024-09-24 PROCEDURE — 85025 COMPLETE CBC W/AUTO DIFF WBC: CPT

## 2024-09-25 ENCOUNTER — HOSPITAL ENCOUNTER (OUTPATIENT)
Dept: RADIOLOGY | Facility: CLINIC | Age: 64
Discharge: HOME | End: 2024-09-25
Payer: MEDICARE

## 2024-09-25 DIAGNOSIS — R10.32 LLQ ABDOMINAL PAIN: ICD-10-CM

## 2024-09-25 PROCEDURE — 74177 CT ABD & PELVIS W/CONTRAST: CPT

## 2024-09-25 PROCEDURE — 2550000001 HC RX 255 CONTRASTS

## 2024-09-26 ENCOUNTER — APPOINTMENT (OUTPATIENT)
Dept: SURGERY | Facility: CLINIC | Age: 64
End: 2024-09-26
Payer: MEDICARE

## 2024-10-02 ENCOUNTER — HOSPITAL ENCOUNTER (OUTPATIENT)
Dept: RADIOLOGY | Facility: CLINIC | Age: 64
Discharge: HOME | End: 2024-10-02
Payer: MEDICARE

## 2024-10-02 DIAGNOSIS — M79.9 SOFT TISSUE LESION OF PELVIC REGION: ICD-10-CM

## 2024-10-02 PROCEDURE — 72197 MRI PELVIS W/O & W/DYE: CPT

## 2024-10-02 PROCEDURE — A9575 INJ GADOTERATE MEGLUMI 0.1ML: HCPCS | Performed by: FAMILY MEDICINE

## 2024-10-02 PROCEDURE — 2550000001 HC RX 255 CONTRASTS: Performed by: FAMILY MEDICINE

## 2024-10-02 RX ORDER — GADOTERATE MEGLUMINE 376.9 MG/ML
15 INJECTION INTRAVENOUS
Status: COMPLETED | OUTPATIENT
Start: 2024-10-02 | End: 2024-10-02

## 2024-10-08 ENCOUNTER — APPOINTMENT (OUTPATIENT)
Dept: OBSTETRICS AND GYNECOLOGY | Facility: CLINIC | Age: 64
End: 2024-10-08
Payer: MEDICARE

## 2024-10-08 ENCOUNTER — LAB (OUTPATIENT)
Dept: LAB | Facility: LAB | Age: 64
End: 2024-10-08
Payer: MEDICARE

## 2024-10-08 VITALS
DIASTOLIC BLOOD PRESSURE: 74 MMHG | BODY MASS INDEX: 26.36 KG/M2 | WEIGHT: 164 LBS | HEIGHT: 66 IN | SYSTOLIC BLOOD PRESSURE: 128 MMHG

## 2024-10-08 DIAGNOSIS — C56.2 MALIGNANT NEOPLASM OF LEFT OVARY (MULTI): ICD-10-CM

## 2024-10-08 DIAGNOSIS — R10.2 PAIN IN FEMALE PELVIS: Primary | ICD-10-CM

## 2024-10-08 DIAGNOSIS — N83.202 LEFT OVARIAN CYST: ICD-10-CM

## 2024-10-08 DIAGNOSIS — R10.2 PAIN IN FEMALE PELVIS: ICD-10-CM

## 2024-10-08 LAB — CANCER AG125 SERPL-ACNC: 7.3 U/ML (ref 0–30.2)

## 2024-10-08 PROCEDURE — 3074F SYST BP LT 130 MM HG: CPT | Performed by: OBSTETRICS & GYNECOLOGY

## 2024-10-08 PROCEDURE — 86304 IMMUNOASSAY TUMOR CA 125: CPT

## 2024-10-08 PROCEDURE — 1159F MED LIST DOCD IN RCRD: CPT | Performed by: OBSTETRICS & GYNECOLOGY

## 2024-10-08 PROCEDURE — 99204 OFFICE O/P NEW MOD 45 MIN: CPT | Performed by: OBSTETRICS & GYNECOLOGY

## 2024-10-08 PROCEDURE — 3078F DIAST BP <80 MM HG: CPT | Performed by: OBSTETRICS & GYNECOLOGY

## 2024-10-08 PROCEDURE — 36415 COLL VENOUS BLD VENIPUNCTURE: CPT

## 2024-10-08 PROCEDURE — 3008F BODY MASS INDEX DOCD: CPT | Performed by: OBSTETRICS & GYNECOLOGY

## 2024-10-08 NOTE — PROGRESS NOTES
Subjective   Patient ID: Radha Gold is a 64 y.o. female who presents for Consult.  Review of recent CT scan,  CT abdomen pelvis w IV contrast  Status: Final result       CT abdomen pelvis w IV contrast (Order [id number])  Study Result    Narrative & Impression  Interpreted By:  Maxi Vásquez,   STUDY:  CT ABDOMEN PELVIS W IV CONTRAST;  9/25/2024 2:05 pm      INDICATION:  Signs/Symptoms:r/o diverticulitis, r/o hernia.      COMPARISON:  None      ACCESSION NUMBER(S):  [id number]      ORDERING CLINICIAN:  HAROON SALGUERO      TECHNIQUE:  Axial CT of the abdomen and pelvis was performed following  intravenous administration of 75 ml of contrast Omnipaque 350 with  coronal and sagittal reconstruction.      FINDINGS:  Lower chest: No focal consolidation or pleural effusion.      Liver: No mass.      Biliary: No intrahepatic or extrahepatic bile duct dilation. No  cholelithiasis.      Spleen: No mass. No splenomegaly.      Pancreas: No mass or duct dilation.      Adrenals: Normal.      Kidneys: No suspicious mass, calculus or hydronephrosis.      GI tract: No bowel wall thickening or dilation. Normal appendix. No  colonic diverticulosis.      Lymph nodes: No lymphadenopathy.      Mesentery/peritoneum: No free fluid, free air or fluid collection.      Vasculature: Mild abdominal aortic atherosclerotic calcifications  without aneurysmal dilation.      Pelvis: No free fluid, free air or fluid collection. There is a  unilocular simple appearing cystic structure centered in the  rectouterine space measuring approximately 7.1 x 6.9 x 5.3 cm.  Grossly unremarkable anteverted uterus.      Bones/Soft tissues: Retrolisthesis L1 over L2 by approximately 2 mm.  Right hip osteoarthritis. Degenerative changes in the left sacroiliac  joint.      IMPRESSION:  Nonspecific unilocular simple appearing cystic structure in the  rectouterine space (7 x 7 x 5 cm). Differential considerations may  include peritoneal inclusion cyst or adnexal  cystic lesion. Consider  further evaluation with female pelvis MRI.      No evidence of hiatal hernia, ventral hernia or colonic  diverticulosis.      MACRO:  None      Signed by: Maxi Vásquez 9/29/2024 8:09 PM  Dictation workstation:   [id number]    Review of MRI,MRReview of note from her primary care physician that led to imaging     pelvis w and wo IV contrast  Status: Final result       MR pelvis w and wo IV contrast (Order [id number]) - Reflex for Order [id number]  Study Result    Narrative & Impression  Interpreted By:  Sylvain Nath,   STUDY:  MR PELVIS W AND WO IV CONTRAST;  10/2/2024 12:25 pm      INDICATION:  Signs/Symptoms:pelvis lesion/mass/cyst.      ,M79.9 Soft tissue disorder, unspecified      COMPARISON:  CT abdomen and pelvis with contrast 25 September 2024      ACCESSION NUMBER(S):  [id number]      ORDERING CLINICIAN:  HAROON SALGUERO      TECHNIQUE:  Multi-planar multi-pulse sequence MRI female pelvis before and after  the uneventful intravenous administration of gadolinium based  contrast (15 mL Dotarem)      FINDINGS:  UTERUS      Size (in cm): 5.4 long axis; 2.1 anteroposterior; 3 transverse      Endometrial Thickness (in mm):  2-3, within normal limits for age      Endometrial Mass Lesion:  Negative      Junctional Zone Thickness (in mm):  2-3, within normal limits      Myometrial Mass:  Negative      CERVIX      Sold mass:  Negative; normal uniform low T2 signal maintained  throughout      OVARIES/ADNEXA      Neither identified      Cysts/s:  5.4 cm craniocaudal, 6.3 x 8.2 cm simple unilocular cyst of  simple water signal intensity composition with a thin imperceptible  wall and a complete absence of any complexity. No enhancement, mural  nodule or any other suggestion of a solid element. No hemorrhagic or  other proteinaceous debris within it. No focal or diffuse wall  thickening. No internal septation.      OTHER PELVIS      Lymph Nodes:  No pelvic adenopathy      Endometriosis Findings:   Negative; no hemorrhagic debris or mass in  the included pelvis      Free Fluid:  Negative      Nongynecologic findings:  No urethral diverticulum. Normal appearance  of the urinary bladder      IMPRESSION:  5.4 cm craniocaudal, 6.3 x 8.2 cm simple unilocular cyst pelvic cyst  as detailed above      When measured at similar locations in a similar fashion on CT 25 September 2024 it measured 5.3 x 6.7 x 6.8 cm      Morphologically it has not changed, a completely simple cyst;  nevertheless, input from OBGYN encouraged      Exact etiology uncertain. Neither ovary reliably identified. Based on  the relationship of the ovarian veins to this finding on prior CT, I  would favor left ovarian/adnexal over right. Location not the most  classic for a lymphocele (if this patient has ever had pelvic surgery)      No other contributory abnormalities in the pelvis      No acute or contributory uterine abnormalities      No acute unexpected findings such as urethral diverticulum      MACRO:  None      Signed by: Sylvain Nath 10/3/2024 9:41 AM  Dictation workstation:   [id number]    Review of primary care note that led to imaging,     Rach Zee DO   Resident  Primary Care     Progress Notes     Attested Addendum     Encounter Date: 9/20/2024    Attested Addendum     Expand All Collapse All       Subjective  Patient ID: Radha Gold is a 64 y.o. female who presents for Diverticulitis.     Past Medical, Surgical, and Family History reviewed and updated in chart.     Reviewed all medications by prescribing practitioner or clinical pharmacist (such as prescriptions, OTCs, herbal therapies and supplements) and documented in the medical record.     HPI  1.  LLQ abdominal pain  Radha is a 64-year-old female who presents with a complaint of left lower quadrant abdominal pain. The initial episode of pain occurred three months ago, characterized by sharp pain that lasted a couple of hours and resolved spontaneously. Most recently, she  experienced severe left lower quadrant pain on Saturday, lasting six hours, which nearly prompted a visit to the ER.    The patient denies any episodes of constipation, diarrhea, or hematochezia. She did report some nausea and vomiting during this recent episode. The pain does not seem to be exacerbated by movement or food.    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 She has experienced intermittent diarrhea over the past year and reports feeling like she is straining more than usual during bowel movements. Increased intra-abdominal pressure does not exacerbate her current pain. At present, she describes a dull discomfort in the left lower quadrant but no acute pain.        Review of Systems  All pertinent positive symptoms are included in the history of present illness.     All other systems have been reviewed and are negative and noncontributory to this patient's current ailments.       Medical History  History reviewed. No pertinent past medical history.      Surgical History  Past Surgical History:  Procedure Laterality Date  · OTHER SURGICAL HISTORY   01/17/2019    Shoulder surgery         Social History  Social History       Tobacco Use  · Smoking status: Never  · Smokeless tobacco: Never  Vaping Use  · Vaping status: Never Used  Substance Use Topics  · Alcohol use: Not Currently  · Drug use: Never         Family History  Family History  Problem Relation Name Age of Onset  · Hyperlipidemia Mother      · Coronary artery disease Father      · Hyperlipidemia Sister      · Hyperlipidemia Brother             Immunization History  Administered Date(s) Administered  · Flu vaccine (IIV4), preservative free *Check age/dose* 09/12/2020, 12/07/2022  · Flu vaccine, quadrivalent, recombinant, preservative free, adult (FLUBLOK) 09/13/2020  · Influenza, injectable, MDCK, quadrivalent 10/29/2019  · Moderna SARS-CoV-2 Vaccination 01/21/2021, 01/22/2021,  "02/19/2021, 11/01/2021       Current Outpatient Medications  Medication Instructions  · cholecalciferol (Vitamin D-3) 50 MCG (2000 UT) tablet 1 tablet, oral, Every 24 hours  · cyanocobalamin, vitamin B-12, (VITAMIN B-12 ORAL) No dose, route, or frequency recorded.  · ergocalciferol (Vitamin D-2) 1.25 MG (40234 UT) capsule 1 capsule, oral, Once Weekly  · irbesartan (AVAPRO) 150 mg, oral, Daily  · LORazepam (ATIVAN) 1 mg, oral, Every 12 hours PRN  · pantoprazole (PROTONIX) 40 mg, oral, 2 times daily, Do not crush, chew, or split. / 2 tablets daily  · PARoxetine (PaxiL) 10 mg tablet 1 tablet, oral, Every morning  · PARoxetine (Paxil) 20 mg tablet 1 tablet, oral, Daily  · PARoxetine (Paxil) 30 mg tablet 1 tablet, oral, Daily (0630)  · sucralfate (CARAFATE) 1 g, oral, 4 times daily before meals and nightly       Allergies  Allergies  Allergen Reactions  · Adhesive Tape-Silicones Rash                Objective    Vitals  Vitals:    09/20/24 1044  BP: 118/78  Pulse: 71  SpO2: 99%  Weight: 74.4 kg (164 lb)  Height: 1.676 m (5' 6\")       Body mass index is 26.47 kg/m².       BP Readings from Last 3 Encounters:  09/20/24 118/78  09/06/24 147/82  08/01/24 121/76       Wt Readings from Last 3 Encounters:  09/20/24 74.4 kg (164 lb)  09/06/24 75.3 kg (166 lb 1.6 oz)  08/01/24 74.5 kg (164 lb 3.2 oz)          No visits with results within 1 Month(s) from this visit.  Latest known visit with results is:  Lab on 08/19/2024  Component Date Value  · WBC 08/19/2024 6.5   · nRBC 08/19/2024 0.0   · RBC 08/19/2024 4.99   · Hemoglobin 08/19/2024 14.4   · Hematocrit 08/19/2024 43.9   · MCV 08/19/2024 88   · MCH 08/19/2024 28.9   · MCHC 08/19/2024 32.8   · RDW 08/19/2024 13.4   · Platelets 08/19/2024 290   · Glucose 08/19/2024 95   · Sodium 08/19/2024 143   · Potassium 08/19/2024 4.7   · Chloride 08/19/2024 104   · Bicarbonate 08/19/2024 27   · Urea Nitrogen 08/19/2024 20   · Creatinine 08/19/2024 1.00   · eGFR 08/19/2024 63 "   · Calcium 08/19/2024 9.8   · Albumin 08/19/2024 4.5   · Alkaline Phosphatase 08/19/2024 116   · Total Protein 08/19/2024 7.1   · AST 08/19/2024 25   · Bilirubin, Total 08/19/2024 0.5   · ALT 08/19/2024 28   · Anion Gap 08/19/2024 12   · Hemoglobin A1C 08/19/2024 6.1 (H)   · Estimated Average Glucose 08/19/2024 128   · Cholesterol 08/19/2024 340 (H)   · HDL-Cholesterol 08/19/2024 54.0   · Cholesterol/HDL Ratio 08/19/2024 6.3   · LDL Calculated 08/19/2024 256 (H)   · Triglycerides 08/19/2024 150   · Thyroid Stimulating Horm* 08/19/2024 3.05   · Vitamin D, 25-Hydroxy, T* 08/19/2024 33      Physical Exam  CONSTITUTIONAL - well nourished, well developed, looks like stated age, in no acute distress, not ill-appearing, and not tired appearing  SKIN - normal skin color and pigmentation, normal skin turgor without rash, lesions, or nodules visualized  HEAD - no trauma, normocephalic  EYES - extraocular muscles are intact, and normal external exam  CHEST - good effort  CARDIAC - regular rate and regular rhythm, no skipped beats, no murmur  ABDOMEN - Mild TTP LLQ. no organomegaly, soft, non-distended, normal bowel sounds, no guarding/rebound/rigidity, negative McBurney sign and negative Landa sign  EXTREMITIES - no obvious or evident edema, no obvious or evident deformities  NEUROLOGICAL - alert, oriented and no focal signs  PSYCHIATRIC - alert, pleasant and cordial, age-appropriate  IMMUNOLOGIC - no cervical lymphadenopathy           Assessment/Plan  I will follow up with imaging results. No red flags.        Problem List Items Addressed This Visit          Gastroesophageal reflux disease    Relevant Medications    pantoprazole (ProtoNix) 40 mg EC tablet    Hiatal hernia    Relevant Medications    pantoprazole (ProtoNix) 40 mg EC tablet    LLQ abdominal pain - Primary        Radha presents today with left lower quadrant abdominal pain. History and exam are concerning for diverticulitis vs hernia. Ovarian torsion is also in  the differential, though I consider it less likely. Can also be an abdominal muscle strain.    I will order a CT scan of the abdomen and pelvis to rule out any abdominal or pelvic pathology. Additionally, the patient is scheduling a colonoscopy in the next 1-2 months.     I will follow up with the patient once the imaging results are available.         Relevant Orders    Comprehensive metabolic panel    CBC and Auto Differential    Amylase    Lipase    CT abdomen pelvis w IV contrast       Other Visit Diagnoses          Immunization counseling                 Patient was staffed with attending physician Dr. Hicks.  Rach Zee DO  Family Medicine, PGY-3               Attestation signed by Celia Hicks MD PhD at 9/20/2024 11:42 AM     I reviewed and examined the patient. I was present for the key exam elements, and I fully participated in the patient's care. I discussed the management of the care with the resident. I have personally reviewed the pertinent labs and imaging, as well as recent notes, with the patient. I have reviewed the note above and agree with the resident's medical decision making as documented in the resident's note.        Agree with the rest of the plan outlined by resident physician. No red flags.      The patient understands and agrees to the assessment and plan of care. Patient has also agreed to follow up and comply with the treatment and evaluation as recommended today. Patient was instructed to call the office at [phone] should questions arise regarding their treatment or care.     Celia Hicks MD, PhD  [facility]  [place] [address]           Inactive Attestations           Attestation signed by Celia Hicks MD PhD at 9/20/2024 11:30 AM to a previous version     I reviewed and examined the patient. I was present for the key exam elements, and I fully participated in  the patient's care. I discussed the management of the care with the resident. I have personally reviewed the pertinent labs and imaging, as well as recent notes, with the patient. I have reviewed the note above and agree with the resident's medical decision making as documented in the resident's note, in addition to the following comments / findings: Emergency Dept and 911/EMS precautions discussed and reviewed with patient.        Agree with the rest of the plan outlined by resident physician. No red flags.      The patient understands and agrees to the assessment and plan of care. Patient has also agreed to follow up and comply with the treatment and evaluation as recommended today. Patient was instructed to call the office at [phone] should questions arise regarding their treatment or care.     Celia Hicks MD, PhD  [facility]  [place] [id number]                New patient to our practice.  64 years old.  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  Single.  No pregnancies no gynecologic surgeries    And a pause was age 36    He reviewed her current meds.    She has had 2 episodes of left lower quadrant pain.  First 1 was 3 months ago was mild she did not do anything about it but then 3 weeks ago and last about 6 hours had some associated vomiting did see her primary care physician.  I reviewed notes from her primary care physician and the imaging including CT scan and MRI.  It is described as a simple unilocular cyst but it is 8 cm in size.  We are uncertain if this is what is causing her symptoms but our first priority is to rule out anything malignant.  I will order a Ca1 2 5.  Have her follow-up in 2 to 4 weeks.  If the Ca1 2 5 is normal we will have options including observation and repeating imaging in 3 months versus surgical intervention to remove the cyst if it is causing  symptoms            Review of Systems   Genitourinary:  Positive for pelvic pain.       Objective   Physical Exam    Assessment/Plan   2 episodes of left lower quadrant pain.  Today she is doing well.  Imaging reviewed.  MRI shows 8 cm unilocular simple cyst.  Obtain Ca1 2 5 and then follow-up in 2 weeks.  If symptoms worsen notify me sooner         Randall Morrison MD 10/08/24 9:44 AM

## 2024-10-14 ENCOUNTER — APPOINTMENT (OUTPATIENT)
Dept: RADIOLOGY | Facility: CLINIC | Age: 64
End: 2024-10-14
Payer: MEDICARE

## 2024-10-18 ENCOUNTER — APPOINTMENT (OUTPATIENT)
Dept: PRIMARY CARE | Facility: CLINIC | Age: 64
End: 2024-10-18
Payer: MEDICARE

## 2024-10-18 VITALS
WEIGHT: 165 LBS | HEART RATE: 92 BPM | SYSTOLIC BLOOD PRESSURE: 128 MMHG | BODY MASS INDEX: 26.52 KG/M2 | OXYGEN SATURATION: 99 % | DIASTOLIC BLOOD PRESSURE: 82 MMHG | HEIGHT: 66 IN

## 2024-10-18 DIAGNOSIS — K21.9 GASTROESOPHAGEAL REFLUX DISEASE, UNSPECIFIED WHETHER ESOPHAGITIS PRESENT: Primary | ICD-10-CM

## 2024-10-18 DIAGNOSIS — R19.00 PELVIC MASS IN FEMALE: ICD-10-CM

## 2024-10-18 DIAGNOSIS — Z23 ENCOUNTER FOR ADMINISTRATION OF VACCINE: ICD-10-CM

## 2024-10-18 DIAGNOSIS — K44.9 HIATAL HERNIA: ICD-10-CM

## 2024-10-18 DIAGNOSIS — Z71.85 IMMUNIZATION COUNSELING: ICD-10-CM

## 2024-10-18 PROCEDURE — 1160F RVW MEDS BY RX/DR IN RCRD: CPT | Performed by: FAMILY MEDICINE

## 2024-10-18 PROCEDURE — 3008F BODY MASS INDEX DOCD: CPT | Performed by: FAMILY MEDICINE

## 2024-10-18 PROCEDURE — G2211 COMPLEX E/M VISIT ADD ON: HCPCS | Performed by: FAMILY MEDICINE

## 2024-10-18 PROCEDURE — 99214 OFFICE O/P EST MOD 30 MIN: CPT | Performed by: FAMILY MEDICINE

## 2024-10-18 PROCEDURE — 90662 IIV NO PRSV INCREASED AG IM: CPT | Performed by: FAMILY MEDICINE

## 2024-10-18 PROCEDURE — 1159F MED LIST DOCD IN RCRD: CPT | Performed by: FAMILY MEDICINE

## 2024-10-18 PROCEDURE — 1036F TOBACCO NON-USER: CPT | Performed by: FAMILY MEDICINE

## 2024-10-18 PROCEDURE — 3079F DIAST BP 80-89 MM HG: CPT | Performed by: FAMILY MEDICINE

## 2024-10-18 PROCEDURE — G0008 ADMIN INFLUENZA VIRUS VAC: HCPCS | Performed by: FAMILY MEDICINE

## 2024-10-18 PROCEDURE — G0009 ADMIN PNEUMOCOCCAL VACCINE: HCPCS | Performed by: FAMILY MEDICINE

## 2024-10-18 PROCEDURE — 90677 PCV20 VACCINE IM: CPT | Performed by: FAMILY MEDICINE

## 2024-10-18 PROCEDURE — 3074F SYST BP LT 130 MM HG: CPT | Performed by: FAMILY MEDICINE

## 2024-10-18 RX ORDER — PANTOPRAZOLE SODIUM 40 MG/1
40 TABLET, DELAYED RELEASE ORAL 2 TIMES DAILY
Qty: 180 TABLET | Refills: 1 | Status: SHIPPED | OUTPATIENT
Start: 2024-10-18 | End: 2025-04-16

## 2024-10-18 NOTE — PROGRESS NOTES
Subjective   Patient ID: Radha Gold is a 64 y.o. female who presents for Follow-up.         Reviewed all medications by prescribing practitioner or clinical pharmacist (such as prescriptions, OTCs, herbal therapies and supplements) and documented in the medical record.    HPI  1.  Follow-up  Patient is here to follow-up regarding workup for lower abdominal pain  Labs were within normal limits  CT scan showed what appears to be pelvic cyst  MRI pelvis was ordered and showed similar cystic lesion in the pelvis with largest length at 8 cm  She has since seen GYN who checked  which was negative  She has a follow-up scheduled with Gyn on 11/1 to discuss management moving forward  She is thinking that she might want the cyst removed at some point  She is not currently having any lower abdominal pain, however pain comes intermittently and can be quite severe  She has a GI workup scheduled with Dr. Anaya regarding her hiatal hernia as well  Overall the patient is doing well today    Review of Systems  All pertinent positive symptoms are included in the history of present illness.    All other systems have been reviewed and are negative and noncontributory to this patient's current ailments.    No past medical history on file.  Past Surgical History:   Procedure Laterality Date    OTHER SURGICAL HISTORY  01/17/2019    Shoulder surgery     Social History     Tobacco Use    Smoking status: Never    Smokeless tobacco: Never   Vaping Use    Vaping status: Never Used   Substance Use Topics    Alcohol use: Not Currently    Drug use: Never     Family History   Problem Relation Name Age of Onset    Hyperlipidemia Mother      Coronary artery disease Father      Hyperlipidemia Sister      Hyperlipidemia Brother       Immunization History   Administered Date(s) Administered    Flu vaccine (IIV4), preservative free *Check age/dose* 09/12/2020, 12/07/2022    Flu vaccine, quadrivalent, recombinant, preservative free, adult  "(FLUBLOK) 09/13/2020    Flu vaccine, trivalent, preservative free, HIGH-DOSE, age 65y+ (Fluzone) 10/18/2024    Influenza, injectable, MDCK, quadrivalent 10/29/2019    Moderna SARS-CoV-2 Vaccination 01/21/2021, 01/22/2021, 02/19/2021, 11/01/2021    Pneumococcal conjugate vaccine, 20-valent (PREVNAR 20) 10/18/2024     Current Outpatient Medications   Medication Instructions    cholecalciferol (Vitamin D-3) 50 MCG (2000 UT) tablet 1 tablet, Every 24 hours    ergocalciferol (Vitamin D-2) 1.25 MG (24026 UT) capsule 1 capsule, Once Weekly    irbesartan (AVAPRO) 150 mg, oral, Daily    LORazepam (ATIVAN) 1 mg, Every 12 hours PRN    pantoprazole (PROTONIX) 40 mg, oral, 2 times daily, Do not crush, chew, or split. / 2 tablets daily    PARoxetine (Paxil) 20 mg tablet 1 tablet, Daily    sucralfate (CARAFATE) 1 g, oral, 4 times daily before meals and nightly     Allergies   Allergen Reactions    Adhesive Tape-Silicones Rash       Objective   Vitals:    10/18/24 1347   BP: 128/82   Pulse: 92   SpO2: 99%   Weight: 74.8 kg (165 lb)   Height: 1.676 m (5' 6\")     Body mass index is 26.63 kg/m².    BP Readings from Last 3 Encounters:   10/18/24 128/82   10/08/24 128/74   09/20/24 118/78      Wt Readings from Last 3 Encounters:   10/18/24 74.8 kg (165 lb)   10/08/24 74.4 kg (164 lb)   10/02/24 73.5 kg (162 lb)        Lab on 10/08/2024   Component Date Value    Cancer  10/08/2024 7.3    Lab on 09/24/2024   Component Date Value    Glucose 09/24/2024 91     Sodium 09/24/2024 140     Potassium 09/24/2024 4.6     Chloride 09/24/2024 104     Bicarbonate 09/24/2024 30     Anion Gap 09/24/2024 11     Urea Nitrogen 09/24/2024 18     Creatinine 09/24/2024 0.94     eGFR 09/24/2024 67     Calcium 09/24/2024 9.5     Albumin 09/24/2024 4.5     Alkaline Phosphatase 09/24/2024 87     Total Protein 09/24/2024 6.8     AST 09/24/2024 19     Bilirubin, Total 09/24/2024 0.7     ALT 09/24/2024 20     WBC 09/24/2024 5.6     nRBC 09/24/2024 0.0     RBC " 09/24/2024 5.05     Hemoglobin 09/24/2024 14.9     Hematocrit 09/24/2024 44.7     MCV 09/24/2024 89     MCH 09/24/2024 29.5     MCHC 09/24/2024 33.3     RDW 09/24/2024 13.4     Platelets 09/24/2024 319     Neutrophils % 09/24/2024 58.0     Immature Granulocytes %,* 09/24/2024 0.2     Lymphocytes % 09/24/2024 29.9     Monocytes % 09/24/2024 8.4     Eosinophils % 09/24/2024 2.1     Basophils % 09/24/2024 1.4     Neutrophils Absolute 09/24/2024 3.25     Immature Granulocytes Ab* 09/24/2024 0.01     Lymphocytes Absolute 09/24/2024 1.68     Monocytes Absolute 09/24/2024 0.47     Eosinophils Absolute 09/24/2024 0.12     Basophils Absolute 09/24/2024 0.08     Amylase 09/24/2024 72     Lipase 09/24/2024 49      Physical Exam  CONSTITUTIONAL - well nourished, well developed, looks like stated age, in no acute distress, not ill-appearing, and not tired appearing  SKIN - normal skin color and pigmentation, normal skin turgor without rash, lesions, or nodules visualized  HEAD - no trauma, normocephalic  CHEST - clear to auscultation, no wheezing, no crackles and no rales, good effort  CARDIAC - regular rate and regular rhythm, no skipped beats, no murmur  ABDOMEN - no organomegaly, soft, nontender, nondistended, normal bowel sounds, no guarding/rebound/rigidity  NEUROLOGICAL - alert, oriented and no focal signs  PSYCHIATRIC - alert, pleasant and cordial, age-appropriate  IMMUNOLOGIC - no cervical lymphadenopathy    Assessment/Plan   Follow-up as needed.  Problem List Items Addressed This Visit       Gastroesophageal reflux disease - Primary     Requesting refills for pantoprazole 40 mg twice daily.  Refill sent to preferred pharmacy         Relevant Medications    pantoprazole (ProtoNix) 40 mg EC tablet    Hiatal hernia    Relevant Medications    pantoprazole (ProtoNix) 40 mg EC tablet    Pelvic mass in female     Workup for lower abdominal pain significant for pelvic cyst.  Referral placed to GynOnc if patient chooses to move  forward with management.         Relevant Orders    Referral to Gynecologic Oncology     Other Visit Diagnoses       Encounter for administration of vaccine        Relevant Orders    Pneumococcal conjugate vaccine, 20-valent (PREVNAR 20) (Completed)    Flu vaccine, trivalent, preservative free, HIGH-DOSE, age 65y+ (Fluzone) (Completed)    Immunization counseling

## 2024-10-18 NOTE — ASSESSMENT & PLAN NOTE
Workup for lower abdominal pain significant for pelvic cyst.  Referral placed to GynOnc if patient chooses to move forward with management.

## 2024-10-22 ENCOUNTER — OFFICE VISIT (OUTPATIENT)
Dept: OBSTETRICS AND GYNECOLOGY | Facility: CLINIC | Age: 64
End: 2024-10-22
Payer: COMMERCIAL

## 2024-10-22 ENCOUNTER — PREP FOR PROCEDURE (OUTPATIENT)
Dept: OBSTETRICS AND GYNECOLOGY | Facility: HOSPITAL | Age: 64
End: 2024-10-22

## 2024-10-22 VITALS
SYSTOLIC BLOOD PRESSURE: 138 MMHG | HEIGHT: 66 IN | DIASTOLIC BLOOD PRESSURE: 74 MMHG | BODY MASS INDEX: 26.36 KG/M2 | WEIGHT: 164 LBS

## 2024-10-22 DIAGNOSIS — N83.202 LEFT OVARIAN CYST: Primary | ICD-10-CM

## 2024-10-22 DIAGNOSIS — N83.202 LEFT OVARIAN CYST: ICD-10-CM

## 2024-10-22 DIAGNOSIS — R10.2 PAIN IN FEMALE PELVIS: Primary | ICD-10-CM

## 2024-10-22 DIAGNOSIS — R10.2 PAIN IN FEMALE PELVIS: ICD-10-CM

## 2024-10-22 PROCEDURE — 99214 OFFICE O/P EST MOD 30 MIN: CPT | Performed by: OBSTETRICS & GYNECOLOGY

## 2024-10-22 PROCEDURE — 3008F BODY MASS INDEX DOCD: CPT | Performed by: OBSTETRICS & GYNECOLOGY

## 2024-10-22 PROCEDURE — 3078F DIAST BP <80 MM HG: CPT | Performed by: OBSTETRICS & GYNECOLOGY

## 2024-10-22 PROCEDURE — 1159F MED LIST DOCD IN RCRD: CPT | Performed by: OBSTETRICS & GYNECOLOGY

## 2024-10-22 PROCEDURE — 3075F SYST BP GE 130 - 139MM HG: CPT | Performed by: OBSTETRICS & GYNECOLOGY

## 2024-10-22 RX ORDER — ACETAMINOPHEN 325 MG/1
975 TABLET ORAL ONCE
OUTPATIENT
Start: 2024-10-22 | End: 2024-10-22

## 2024-10-22 RX ORDER — GABAPENTIN 600 MG/1
600 TABLET ORAL ONCE
OUTPATIENT
Start: 2024-10-22 | End: 2024-10-22

## 2024-10-22 RX ORDER — CELECOXIB 200 MG/1
400 CAPSULE ORAL ONCE
OUTPATIENT
Start: 2024-10-22 | End: 2024-10-22

## 2024-10-22 NOTE — PROGRESS NOTES
Subjective   Patient ID: Radha Gold is a 64 y.o. female who presents for Pelvic Pain.  Review of my last note, since then she has had 2 episodes this past Saturday where the pain was pretty debilitating.       Randall Morrison MD  Physician  Obstetrics     Progress Notes     Signed     Encounter Date: 10/8/2024    Signed     Expand All Collapse All       Subjective  Patient ID: Radha Gold is a 64 y.o. female who presents for Consult.  Review of recent CT scan,  CT abdomen pelvis w IV contrast  Status: Final result        CT abdomen pelvis w IV contrast (Order [id number])  Study Result     Narrative & Impression  Interpreted By:  Maxi Vásquez,   STUDY:  CT ABDOMEN PELVIS W IV CONTRAST;  9/25/2024 2:05 pm      INDICATION:  Signs/Symptoms:r/o diverticulitis, r/o hernia.      COMPARISON:  None      ACCESSION NUMBER(S):  [id number]      ORDERING CLINICIAN:  HAROON SALGUERO      TECHNIQUE:  Axial CT of the abdomen and pelvis was performed following  intravenous administration of 75 ml of contrast Omnipaque 350 with  coronal and sagittal reconstruction.      FINDINGS:  Lower chest: No focal consolidation or pleural effusion.      Liver: No mass.      Biliary: No intrahepatic or extrahepatic bile duct dilation. No  cholelithiasis.      Spleen: No mass. No splenomegaly.      Pancreas: No mass or duct dilation.      Adrenals: Normal.      Kidneys: No suspicious mass, calculus or hydronephrosis.      GI tract: No bowel wall thickening or dilation. Normal appendix. No  colonic diverticulosis.      Lymph nodes: No lymphadenopathy.      Mesentery/peritoneum: No free fluid, free air or fluid collection.      Vasculature: Mild abdominal aortic atherosclerotic calcifications  without aneurysmal dilation.      Pelvis: No free fluid, free air or fluid collection. There is a  unilocular simple appearing cystic structure centered in the  rectouterine space measuring approximately 7.1 x 6.9 x 5.3 cm.  Grossly unremarkable  anteverted uterus.      Bones/Soft tissues: Retrolisthesis L1 over L2 by approximately 2 mm.  Right hip osteoarthritis. Degenerative changes in the left sacroiliac  joint.      IMPRESSION:  Nonspecific unilocular simple appearing cystic structure in the  rectouterine space (7 x 7 x 5 cm). Differential considerations may  include peritoneal inclusion cyst or adnexal cystic lesion. Consider  further evaluation with female pelvis MRI.      No evidence of hiatal hernia, ventral hernia or colonic  diverticulosis.      MACRO:  None      Signed by: Maxi Vásquez 9/29/2024 8:09 PM  Dictation workstation:   [id number]     Review of MRI,MRReview of note from her primary care physician that led to imaging      pelvis w and wo IV contrast  Status: Final result        MR pelvis w and wo IV contrast (Order [id number]) - Reflex for Order [id number]  Study Result     Narrative & Impression  Interpreted By:  Sylvain Nath,   STUDY:  MR PELVIS W AND WO IV CONTRAST;  10/2/2024 12:25 pm      INDICATION:  Signs/Symptoms:pelvis lesion/mass/cyst.      ,M79.9 Soft tissue disorder, unspecified      COMPARISON:  CT abdomen and pelvis with contrast 25 September 2024      ACCESSION NUMBER(S):  [id number]      ORDERING CLINICIAN:  HAROON SALGUERO      TECHNIQUE:  Multi-planar multi-pulse sequence MRI female pelvis before and after  the uneventful intravenous administration of gadolinium based  contrast (15 mL Dotarem)      FINDINGS:  UTERUS      Size (in cm): 5.4 long axis; 2.1 anteroposterior; 3 transverse      Endometrial Thickness (in mm):  2-3, within normal limits for age      Endometrial Mass Lesion:  Negative      Junctional Zone Thickness (in mm):  2-3, within normal limits      Myometrial Mass:  Negative      CERVIX      Sold mass:  Negative; normal uniform low T2 signal maintained  throughout      OVARIES/ADNEXA      Neither identified      Cysts/s:  5.4 cm craniocaudal, 6.3 x 8.2 cm simple unilocular cyst of  simple water signal  intensity composition with a thin imperceptible  wall and a complete absence of any complexity. No enhancement, mural  nodule or any other suggestion of a solid element. No hemorrhagic or  other proteinaceous debris within it. No focal or diffuse wall  thickening. No internal septation.      OTHER PELVIS      Lymph Nodes:  No pelvic adenopathy      Endometriosis Findings:  Negative; no hemorrhagic debris or mass in  the included pelvis      Free Fluid:  Negative      Nongynecologic findings:  No urethral diverticulum. Normal appearance  of the urinary bladder      IMPRESSION:  5.4 cm craniocaudal, 6.3 x 8.2 cm simple unilocular cyst pelvic cyst  as detailed above      When measured at similar locations in a similar fashion on CT 25 September 2024 it measured 5.3 x 6.7 x 6.8 cm      Morphologically it has not changed, a completely simple cyst;  nevertheless, input from OBGYN encouraged      Exact etiology uncertain. Neither ovary reliably identified. Based on  the relationship of the ovarian veins to this finding on prior CT, I  would favor left ovarian/adnexal over right. Location not the most  classic for a lymphocele (if this patient has ever had pelvic surgery)      No other contributory abnormalities in the pelvis      No acute or contributory uterine abnormalities      No acute unexpected findings such as urethral diverticulum      MACRO:  None      Signed by: Sylvain Nath 10/3/2024 9:41 AM  Dictation workstation:   [id number]     Review of primary care note that led to imaging,     Rach Zee DO   Resident  Primary Care     Progress Notes     Attested Addendum     Encounter Date: 9/20/2024     Attested Addendum         Expand All Collapse All        Subjective  Patient ID: Radha Gold is a 64 y.o. female who presents for Diverticulitis.     Past Medical, Surgical, and Family History reviewed and updated in chart.     Reviewed all medications by prescribing practitioner or clinical pharmacist (such as  prescriptions, OTCs, herbal therapies and supplements) and documented in the medical record.     HPI  1.  LLQ abdominal pain  Radha is a 64-year-old female who presents with a complaint of left lower quadrant abdominal pain. The initial episode of pain occurred three months ago, characterized by sharp pain that lasted a couple of hours and resolved spontaneously. Most recently, she experienced severe left lower quadrant pain on Saturday, lasting six hours, which nearly prompted a visit to the ER.     The patient denies any episodes of constipation, diarrhea, or hematochezia. She did report some nausea and vomiting during this recent episode. The pain does not seem to be exacerbated by movement or food.     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 She has experienced intermittent diarrhea over the past year and reports feeling like she is straining more than usual during bowel movements. Increased intra-abdominal pressure does not exacerbate her current pain. At present, she describes a dull discomfort in the left lower quadrant but no acute pain.        Review of Systems  All pertinent positive symptoms are included in the history of present illness.     All other systems have been reviewed and are negative and noncontributory to this patient's current ailments.        Medical History  History reviewed. No pertinent past medical history.        Surgical History  Past Surgical History:  ProcedureLateralityDate  ·            OTHER SURGICAL HISTORY                01/17/2019              Shoulder surgery           Social History  Social History        Tobacco Use  ·Smoking status:Never  ·Smokeless tobacco:Never  Vaping Use  ·Vaping status:Never Used  Substance Use Topics  ·Alcohol use:Not Currently  ·Drug use:Never           Family History  Family History  ProblemRelationNameAge of Onset  ·            Hyperlipidemia            Mother                ·        "     Coronary artery disease          Father                 ·            Hyperlipidemia            Sister                   ·            Hyperlipidemia            Brother                                    Immunization History  AdministeredDate(s) Administered  ·Flu vaccine (IIV4), preservative free *Check age/dose*09/12/2020, 12/07/2022  ·Flu vaccine, quadrivalent, recombinant, preservative free, adult (FLUBLOK)09/13/2020  ·Influenza, injectable, MDCK, xadicrszhtfi59/29/2019  ·Moderna SARS-CoV-2 Zgzvfrsjcrh52/21/2021, 01/22/2021, 02/19/2021, 11/01/2021        Current Outpatient Medications  MedicationInstructions  ·cholecalciferol (Vitamin D-3) 50 MCG (2000 UT) tablet1 tablet, oral, Every 24 hours  ·cyanocobalamin, vitamin B-12, (VITAMIN B-12 ORAL)No dose, route, or frequency recorded.  ·ergocalciferol (Vitamin D-2) 1.25 MG (63269 UT) capsule1 capsule, oral, Once Weekly  ·irbesartan (AVAPRO)150 mg, oral, Daily  ·LORazepam (ATIVAN)1 mg, oral, Every 12 hours PRN  ·pantoprazole (PROTONIX)40 mg, oral, 2 times daily, Do not crush, chew, or split. / 2 tablets daily  ·PARoxetine (PaxiL) 10 mg tablet1 tablet, oral, Every morning  ·PARoxetine (Paxil) 20 mg tablet1 tablet, oral, Daily  ·PARoxetine (Paxil) 30 mg tablet1 tablet, oral, Daily (0630)  ·sucralfate (CARAFATE)1 g, oral, 4 times daily before meals and nightly        Allergies  Allergies  AllergenReactions  ·Adhesive Tape-SiliconesRash                 Objective     Vitals  Vitals:              09/20/24 1044  BP:118/78  Pulse:71  SpO2:99%  Weight:74.4 kg (164 lb)  Height:1.676 m (5' 6\")        Body mass index is 26.47 kg/m².        BP Readings from Last 3 Encounters:  09/20/24118/78 09/06/24147/82  08/01/24121/76        Wt Readings from Last 3 Encounters:  09/20/2474.4 kg (164 lb)  09/06/2475.3 kg (166 lb 1.6 oz)  08/01/2474.5 kg (164 lb 3.2 oz)           No visits with results within 1 Month(s) from this visit.  Latest known visit with results is:  Lab on " 08/19/2024  ComponentDateValue  ·            WBC     08/19/2024      6.5   ·            nRBC    08/19/2024      0.0   ·            RBC      08/19/2024      4.99   ·            Hemoglobin    08/19/2024      14.4   ·            Hematocrit      08/19/2024      43.9   ·            MCV     08/19/2024      88   ·            MCH    08/19/2024      28.9   ·            MCHC  08/19/2024      32.8   ·            RDW    08/19/2024      13.4   ·            Platelets           08/19/2024      290   ·            Glucose            08/19/2024      95   ·            Sodium            08/19/2024      143   ·            Potassium        08/19/2024      4.7   ·            Chloride           08/19/2024      104   ·            Bicarbonate     08/19/2024      27   ·            Urea Nitrogen 08/19/2024      20   ·            Creatinine        08/19/2024      1.00   ·            eGFR    08/19/2024      63   ·            Calcium            08/19/2024      9.8   ·            Albumin           08/19/2024      4.5   ·            Alkaline Phosphatase  08/19/2024      116   ·            Total Protein    08/19/2024      7.1   ·            AST      08/19/2024      25   ·            Bilirubin, Total 08/19/2024      0.5   ·            ALT      08/19/2024      28   ·            Anion Gap       08/19/2024      12   ·            Hemoglobin A1C         08/19/2024      6.1 (H)   ·            Estimated Average Glucose     08/19/2024      128   ·            Cholesterol      08/19/2024      340 (H)   ·            HDL-Cholesterol          08/19/2024      54.0   ·            Cholesterol/HDL Ratio08/19/2024      6.3   ·            LDL Calculated 08/19/2024      256 (H)   ·            Triglycerides    08/19/2024      150   ·            Thyroid Stimulating Horm*     08/19/2024      3.05   ·            Vitamin D, 25-Hydroxy, T*       08/19/2024      33      Physical Exam  CONSTITUTIONAL - well nourished, well developed, looks like stated age, in no acute distress,  not ill-appearing, and not tired appearing  SKIN - normal skin color and pigmentation, normal skin turgor without rash, lesions, or nodules visualized  HEAD - no trauma, normocephalic  EYES - extraocular muscles are intact, and normal external exam  CHEST - good effort  CARDIAC - regular rate and regular rhythm, no skipped beats, no murmur  ABDOMEN - Mild TTP LLQ. no organomegaly, soft, non-distended, normal bowel sounds, no guarding/rebound/rigidity, negative McBurney sign and negative Landa sign  EXTREMITIES - no obvious or evident edema, no obvious or evident deformities  NEUROLOGICAL - alert, oriented and no focal signs  PSYCHIATRIC - alert, pleasant and cordial, age-appropriate  IMMUNOLOGIC - no cervical lymphadenopathy           Assessment/Plan  I will follow up with imaging results. No red flags.         Problem List Items Addressed This Visit                     Gastroesophageal reflux disease              Relevant Medications              pantoprazole (ProtoNix) 40 mg EC tablet              Hiatal hernia              Relevant Medications              pantoprazole (ProtoNix) 40 mg EC tablet              LLQ abdominal pain - Primary                            Radha presents today with left lower quadrant abdominal pain. History and exam are concerning for diverticulitis vs hernia. Ovarian torsion is also in the differential, though I consider it less likely. Can also be an abdominal muscle strain.     I will order a CT scan of the abdomen and pelvis to rule out any abdominal or pelvic pathology. Additionally, the patient is scheduling a colonoscopy in the next 1-2 months.     I will follow up with the patient once the imaging results are available.                    Relevant Orders              Comprehensive metabolic panel              CBC and Auto Differential              Amylase              Lipase              CT abdomen pelvis w IV contrast        Other Visit Diagnoses                     Immunization  counseling                  Patient was staffed with attending physician Dr. Hicks.  Rach Zee, DO  Family Medicine, PGY-3                            Attestation signed by Celia Hicks MD PhD at 9/20/2024 11:42 AM     I reviewed and examined the patient. I was present for the key exam elements, and I fully participated in the patient's care. I discussed the management of the care with the resident. I have personally reviewed the pertinent labs and imaging, as well as recent notes, with the patient. I have reviewed the note above and agree with the resident's medical decision making as documented in the resident's note.        Agree with the rest of the plan outlined by resident physician. No red flags.      The patient understands and agrees to the assessment and plan of care. Patient has also agreed to follow up and comply with the treatment and evaluation as recommended today. Patient was instructed to call the office at [phone] should questions arise regarding their treatment or care.     Celia Hicks MD, PhD  [facility]  [place] [address]              Inactive Attestations                         Attestation signed by Celia Hicks MD PhD at 9/20/2024 11:30 AM to a previous version     I reviewed and examined the patient. I was present for the key exam elements, and I fully participated in the patient's care. I discussed the management of the care with the resident. I have personally reviewed the pertinent labs and imaging, as well as recent notes, with the patient. I have reviewed the note above and agree with the resident's medical decision making as documented in the resident's note, in addition to the following comments / findings: Emergency Dept and 911/EMS precautions discussed and reviewed with patient.        Agree with the rest of the plan outlined by resident physician. No red  flags.      The patient understands and agrees to the assessment and plan of care. Patient has also agreed to follow up and comply with the treatment and evaluation as recommended today. Patient was instructed to call the office at [phone] should questions arise regarding their treatment or care.     Celia Hciks MD, PhD  [facility]  [place] [address]                       New patient to our practice.  64 years old.  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  Single.  No pregnancies no gynecologic surgeries     And a pause was age 36     He reviewed her current meds.     She has had 2 episodes of left lower quadrant pain.  First 1 was 3 months ago was mild she did not do anything about it but then 3 weeks ago and last about 6 hours had some associated vomiting did see her primary care physician.  I reviewed notes from her primary care physician and the imaging including CT scan and MRI.  It is described as a simple unilocular cyst but it is 8 cm in size.  We are uncertain if this is what is causing her symptoms but our first priority is to rule out anything malignant.  I will order a Ca1 2 5.  Have her follow-up in 2 to 4 weeks.  If the Ca1 2 5 is normal we will have options including observation and repeating imaging in 3 months versus surgical intervention to remove the cyst if it is causing symptoms                 Review of Systems   Genitourinary:  Positive for pelvic pain.            Objective  Physical Exam        Assessment/Plan  2 episodes of left lower quadrant pain.  Today she is doing well.  Imaging reviewed.  MRI shows 8 cm unilocular simple cyst.  Obtain Ca1 2 5 and then follow-up in 2 weeks.  If symptoms worsen notify me sooner        Randall Morrison MD 10/08/24 9:44 AM           Ca1 2 5 is normal  Status: Final result       Visible to patient: Yes (seen)       Next appt: 10/23/2024  at 10:45 AM in Radiology ([facility])       Dx: Pain in female pelvis; Left ovarian c...    2 Result Notes     Component  Ref Range & Units 2 wk ago  Cancer   0.0 - 30.2 U/mL 7.3  Resulting Agency [facility]           Narrative  Performed by: [facility]   testing is performed by chemiluminescent immunoassay using the Siemens "[employer]. Values obtained with different analytic methods cannot be used interchangeably.     Serum  measurement is intended for use as an aid in monitoring patients previously treated for ovarian cancer. This assay is not intended for screening or diagnosis of cancer in the general population. The results must not be used as the sole means for clinical diagnosis or patient management decisions.  Specimen Collected: 10/08/24 10:02    Wants to proceed with surgical removal.  Plan would be laparoscopy and ideally would like to remove both adnexa but main priority is to remove the cyst and relieve her pain.  Reviewed surgery risk benefits complications and recovery.  No medical allergies.  No history of any previous gynecologic or abdominal surgery    Pelvic pain, left ovarian cyst.  For laparoscopic BSO, possible ovarian cystectomy.  Has colonoscopy on 4 November    Pelvic Pain  The patient's primary symptoms include pelvic pain.       Review of Systems   Genitourinary:  Positive for pelvic pain.       Objective   Physical Exam  Constitutional:       Appearance: Normal appearance. She is normal weight.   Neurological:      Mental Status: She is alert.         Assessment/Plan   Pain in female pelvis.  Left ovarian cyst.  Normal Ca1 2 5.  Pain is affecting her quality of life.  Would like to have it removed.  Plan is laparoscopy and ideally bilateral salpingo-oophorectomy, at a minimum excision of cyst.  Reviewed surgery risk benefits complications recovery         Randall Morrison MD 10/22/24 1:54 PM

## 2024-10-23 ENCOUNTER — HOSPITAL ENCOUNTER (OUTPATIENT)
Dept: RADIOLOGY | Facility: HOSPITAL | Age: 64
Discharge: HOME | End: 2024-10-23
Payer: COMMERCIAL

## 2024-10-23 DIAGNOSIS — K44.9 HIATAL HERNIA: ICD-10-CM

## 2024-10-23 PROCEDURE — A9698 NON-RAD CONTRAST MATERIALNOC: HCPCS | Performed by: SURGERY

## 2024-10-23 PROCEDURE — 74246 X-RAY XM UPR GI TRC 2CNTRST: CPT | Performed by: RADIOLOGY

## 2024-10-23 PROCEDURE — 74240 X-RAY XM UPR GI TRC 1CNTRST: CPT

## 2024-10-23 PROCEDURE — 2500000001 HC RX 250 WO HCPCS SELF ADMINISTERED DRUGS (ALT 637 FOR MEDICARE OP): Performed by: SURGERY

## 2024-10-23 PROCEDURE — 2500000005 HC RX 250 GENERAL PHARMACY W/O HCPCS: Performed by: SURGERY

## 2024-11-01 ENCOUNTER — APPOINTMENT (OUTPATIENT)
Dept: OBSTETRICS AND GYNECOLOGY | Facility: CLINIC | Age: 64
End: 2024-11-01
Payer: MEDICARE

## 2024-11-04 ENCOUNTER — APPOINTMENT (OUTPATIENT)
Dept: OPERATING ROOM | Facility: HOSPITAL | Age: 64
End: 2024-11-04
Payer: MEDICARE

## 2024-11-05 ENCOUNTER — APPOINTMENT (OUTPATIENT)
Dept: CARDIOLOGY | Facility: CLINIC | Age: 64
End: 2024-11-05
Payer: MEDICARE

## 2024-11-12 ENCOUNTER — ANESTHESIA EVENT (OUTPATIENT)
Dept: OPERATING ROOM | Facility: HOSPITAL | Age: 64
End: 2024-11-12
Payer: MEDICARE

## 2024-11-13 ENCOUNTER — TELEPHONE (OUTPATIENT)
Dept: PREADMISSION TESTING | Facility: HOSPITAL | Age: 64
End: 2024-11-13

## 2024-11-13 NOTE — H&P
History Of Present Illness  Radha Gold is a 64 y.o. female presenting with    Randall Morrison MD  Physician  Obstetrics     Progress Notes     Signed     Encounter Date: 10/22/2024     Signed       Expand All Collapse All       Subjective  Patient ID: Radha Gold is a 64 y.o. female who presents for Pelvic Pain.  Review of my last note, since then she has had 2 episodes this past Saturday where the pain was pretty debilitating.        Randall Morrison MD  Physician  Obstetrics     Progress Notes     Signed     Encounter Date: 10/8/2024     Signed      Expand All Collapse All        Subjective  Patient ID: Radha Gold is a 64 y.o. female who presents for Consult.  Review of recent CT scan,  CT abdomen pelvis w IV contrast  Status: Final result        CT abdomen pelvis w IV contrast (Order [id number])  Study Result     Narrative & Impression  Interpreted By:  Maxi Vásquez,   STUDY:  CT ABDOMEN PELVIS W IV CONTRAST;  9/25/2024 2:05 pm      INDICATION:  Signs/Symptoms:r/o diverticulitis, r/o hernia.      COMPARISON:  None      ACCESSION NUMBER(S):  [id number]      ORDERING CLINICIAN:  HAROON SALGUERO      TECHNIQUE:  Axial CT of the abdomen and pelvis was performed following  intravenous administration of 75 ml of contrast Omnipaque 350 with  coronal and sagittal reconstruction.      FINDINGS:  Lower chest: No focal consolidation or pleural effusion.      Liver: No mass.      Biliary: No intrahepatic or extrahepatic bile duct dilation. No  cholelithiasis.      Spleen: No mass. No splenomegaly.      Pancreas: No mass or duct dilation.      Adrenals: Normal.      Kidneys: No suspicious mass, calculus or hydronephrosis.      GI tract: No bowel wall thickening or dilation. Normal appendix. No  colonic diverticulosis.      Lymph nodes: No lymphadenopathy.      Mesentery/peritoneum: No free fluid, free air or fluid collection.      Vasculature: Mild abdominal aortic atherosclerotic calcifications  without aneurysmal  dilation.      Pelvis: No free fluid, free air or fluid collection. There is a  unilocular simple appearing cystic structure centered in the  rectouterine space measuring approximately 7.1 x 6.9 x 5.3 cm.  Grossly unremarkable anteverted uterus.      Bones/Soft tissues: Retrolisthesis L1 over L2 by approximately 2 mm.  Right hip osteoarthritis. Degenerative changes in the left sacroiliac  joint.      IMPRESSION:  Nonspecific unilocular simple appearing cystic structure in the  rectouterine space (7 x 7 x 5 cm). Differential considerations may  include peritoneal inclusion cyst or adnexal cystic lesion. Consider  further evaluation with female pelvis MRI.      No evidence of hiatal hernia, ventral hernia or colonic  diverticulosis.      MACRO:  None      Signed by: Maxi Vásquez 9/29/2024 8:09 PM  Dictation workstation:   [id number]     Review of MRI,MRReview of note from her primary care physician that led to imaging      pelvis w and wo IV contrast  Status: Final result        MR pelvis w and wo IV contrast (Order [id number]) - Reflex for Order [id number]  Study Result     Narrative & Impression  Interpreted By:  ySlvain Nath,   STUDY:  MR PELVIS W AND WO IV CONTRAST;  10/2/2024 12:25 pm      INDICATION:  Signs/Symptoms:pelvis lesion/mass/cyst.      ,M79.9 Soft tissue disorder, unspecified      COMPARISON:  CT abdomen and pelvis with contrast 25 September 2024      ACCESSION NUMBER(S):  [id number]      ORDERING CLINICIAN:  HAROON SALGUERO      TECHNIQUE:  Multi-planar multi-pulse sequence MRI female pelvis before and after  the uneventful intravenous administration of gadolinium based  contrast (15 mL Dotarem)      FINDINGS:  UTERUS      Size (in cm): 5.4 long axis; 2.1 anteroposterior; 3 transverse      Endometrial Thickness (in mm):  2-3, within normal limits for age      Endometrial Mass Lesion:  Negative      Junctional Zone Thickness (in mm):  2-3, within normal limits      Myometrial Mass:  Negative       CERVIX      Sold mass:  Negative; normal uniform low T2 signal maintained  throughout      OVARIES/ADNEXA      Neither identified      Cysts/s:  5.4 cm craniocaudal, 6.3 x 8.2 cm simple unilocular cyst of  simple water signal intensity composition with a thin imperceptible  wall and a complete absence of any complexity. No enhancement, mural  nodule or any other suggestion of a solid element. No hemorrhagic or  other proteinaceous debris within it. No focal or diffuse wall  thickening. No internal septation.      OTHER PELVIS      Lymph Nodes:  No pelvic adenopathy      Endometriosis Findings:  Negative; no hemorrhagic debris or mass in  the included pelvis      Free Fluid:  Negative      Nongynecologic findings:  No urethral diverticulum. Normal appearance  of the urinary bladder      IMPRESSION:  5.4 cm craniocaudal, 6.3 x 8.2 cm simple unilocular cyst pelvic cyst  as detailed above      When measured at similar locations in a similar fashion on CT 25 September 2024 it measured 5.3 x 6.7 x 6.8 cm      Morphologically it has not changed, a completely simple cyst;  nevertheless, input from OBGYN encouraged      Exact etiology uncertain. Neither ovary reliably identified. Based on  the relationship of the ovarian veins to this finding on prior CT, I  would favor left ovarian/adnexal over right. Location not the most  classic for a lymphocele (if this patient has ever had pelvic surgery)      No other contributory abnormalities in the pelvis      No acute or contributory uterine abnormalities      No acute unexpected findings such as urethral diverticulum      MACRO:  None      Signed by: Sylvain Nath 10/3/2024 9:41 AM  Dictation workstation:   [id number]     Review of primary care note that led to imaging,     Rach Zee DO   Resident  Primary Care     Progress Notes     Attested Addendum     Encounter Date: 9/20/2024     Attested Addendum         Expand All Collapse All        Subjective  Patient ID: Radha BLUE  Alla is a 64 y.o. female who presents for Diverticulitis.     Past Medical, Surgical, and Family History reviewed and updated in chart.     Reviewed all medications by prescribing practitioner or clinical pharmacist (such as prescriptions, OTCs, herbal therapies and supplements) and documented in the medical record.     HPI  1.  LLQ abdominal pain  Radha is a 64-year-old female who presents with a complaint of left lower quadrant abdominal pain. The initial episode of pain occurred three months ago, characterized by sharp pain that lasted a couple of hours and resolved spontaneously. Most recently, she experienced severe left lower quadrant pain on Saturday, lasting six hours, which nearly prompted a visit to the ER.     The patient denies any episodes of constipation, diarrhea, or hematochezia. She did report some nausea and vomiting during this recent episode. The pain does not seem to be exacerbated by movement or food.     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 She has experienced intermittent diarrhea over the past year and reports feeling like she is straining more than usual during bowel movements. Increased intra-abdominal pressure does not exacerbate her current pain. At present, she describes a dull discomfort in the left lower quadrant but no acute pain.        Review of Systems  All pertinent positive symptoms are included in the history of present illness.     All other systems have been reviewed and are negative and noncontributory to this patient's current ailments.        Medical History  History reviewed. No pertinent past medical history.        Surgical History  Past Surgical History:  ProcedureLateralityDate  ·            OTHER SURGICAL HISTORY                01/17/2019              Shoulder surgery           Social History  Social History        Tobacco Use  ·Smoking status:Never  ·Smokeless tobacco:Never  Vaping Use  ·Vaping  "status:Never Used  Substance Use Topics  ·Alcohol use:Not Currently  ·Drug use:Never           Family History  Family History  ProblemRelationNameAge of Onset  ·            Hyperlipidemia            Mother                ·            Coronary artery disease          Father                 ·            Hyperlipidemia            Sister                   ·            Hyperlipidemia            Brother                                    Immunization History  AdministeredDate(s) Administered  ·Flu vaccine (IIV4), preservative free *Check age/dose*09/12/2020, 12/07/2022  ·Flu vaccine, quadrivalent, recombinant, preservative free, adult (FLUBLOK)09/13/2020  ·Influenza, injectable, MDCK, hycesyzdfpfy75/29/2019  ·Moderna SARS-CoV-2 Evdtbbsohws35/21/2021, 01/22/2021, 02/19/2021, 11/01/2021        Current Outpatient Medications  MedicationInstructions  ·cholecalciferol (Vitamin D-3) 50 MCG (2000 UT) tablet1 tablet, oral, Every 24 hours  ·cyanocobalamin, vitamin B-12, (VITAMIN B-12 ORAL)No dose, route, or frequency recorded.  ·ergocalciferol (Vitamin D-2) 1.25 MG (50303 UT) capsule1 capsule, oral, Once Weekly  ·irbesartan (AVAPRO)150 mg, oral, Daily  ·LORazepam (ATIVAN)1 mg, oral, Every 12 hours PRN  ·pantoprazole (PROTONIX)40 mg, oral, 2 times daily, Do not crush, chew, or split. / 2 tablets daily  ·PARoxetine (PaxiL) 10 mg tablet1 tablet, oral, Every morning  ·PARoxetine (Paxil) 20 mg tablet1 tablet, oral, Daily  ·PARoxetine (Paxil) 30 mg tablet1 tablet, oral, Daily (0630)  ·sucralfate (CARAFATE)1 g, oral, 4 times daily before meals and nightly        Allergies  Allergies  AllergenReactions  ·Adhesive Tape-SiliconesRash                 Objective     Vitals  Vitals:              09/20/24 1044  BP:118/78  Pulse:71  SpO2:99%  Weight:74.4 kg (164 lb)  Height:1.676 m (5' 6\")        Body mass index is 26.47 kg/m².        BP Readings from Last 3 Encounters:  09/20/24118/78 09/06/24147/82 08/01/24121/76        Wt Readings from Last " 3 Encounters:  09/20/2474.4 kg (164 lb)  09/06/2475.3 kg (166 lb 1.6 oz)  08/01/2474.5 kg (164 lb 3.2 oz)           No visits with results within 1 Month(s) from this visit.  Latest known visit with results is:  Lab on 08/19/2024  ComponentDateValue  ·            WBC     08/19/2024      6.5   ·            nRBC    08/19/2024      0.0   ·            RBC      08/19/2024      4.99   ·            Hemoglobin    08/19/2024      14.4   ·            Hematocrit      08/19/2024      43.9   ·            MCV     08/19/2024      88   ·            MCH    08/19/2024      28.9   ·            MCHC  08/19/2024      32.8   ·            RDW    08/19/2024      13.4   ·            Platelets           08/19/2024      290   ·            Glucose            08/19/2024      95   ·            Sodium            08/19/2024      143   ·            Potassium        08/19/2024      4.7   ·            Chloride           08/19/2024      104   ·            Bicarbonate     08/19/2024      27   ·            Urea Nitrogen 08/19/2024      20   ·            Creatinine        08/19/2024      1.00   ·            eGFR    08/19/2024      63   ·            Calcium            08/19/2024      9.8   ·            Albumin           08/19/2024      4.5   ·            Alkaline Phosphatase  08/19/2024      116   ·            Total Protein    08/19/2024      7.1   ·            AST      08/19/2024      25   ·            Bilirubin, Total 08/19/2024      0.5   ·            ALT      08/19/2024      28   ·            Anion Gap       08/19/2024      12   ·            Hemoglobin A1C         08/19/2024      6.1 (H)   ·            Estimated Average Glucose     08/19/2024      128   ·            Cholesterol      08/19/2024      340 (H)   ·            HDL-Cholesterol          08/19/2024      54.0   ·            Cholesterol/HDL Ratio08/19/2024      6.3   ·            LDL Calculated 08/19/2024      256 (H)   ·            Triglycerides    08/19/2024      150   ·            Thyroid  Stimulating Horm*     08/19/2024      3.05   ·            Vitamin D, 25-Hydroxy, T*       08/19/2024      33      Physical Exam  CONSTITUTIONAL - well nourished, well developed, looks like stated age, in no acute distress, not ill-appearing, and not tired appearing  SKIN - normal skin color and pigmentation, normal skin turgor without rash, lesions, or nodules visualized  HEAD - no trauma, normocephalic  EYES - extraocular muscles are intact, and normal external exam  CHEST - good effort  CARDIAC - regular rate and regular rhythm, no skipped beats, no murmur  ABDOMEN - Mild TTP LLQ. no organomegaly, soft, non-distended, normal bowel sounds, no guarding/rebound/rigidity, negative McBurney sign and negative Landa sign  EXTREMITIES - no obvious or evident edema, no obvious or evident deformities  NEUROLOGICAL - alert, oriented and no focal signs  PSYCHIATRIC - alert, pleasant and cordial, age-appropriate  IMMUNOLOGIC - no cervical lymphadenopathy           Assessment/Plan  I will follow up with imaging results. No red flags.         Problem List Items Addressed This Visit                     Gastroesophageal reflux disease              Relevant Medications              pantoprazole (ProtoNix) 40 mg EC tablet              Hiatal hernia              Relevant Medications              pantoprazole (ProtoNix) 40 mg EC tablet              LLQ abdominal pain - Primary                            Radha presents today with left lower quadrant abdominal pain. History and exam are concerning for diverticulitis vs hernia. Ovarian torsion is also in the differential, though I consider it less likely. Can also be an abdominal muscle strain.     I will order a CT scan of the abdomen and pelvis to rule out any abdominal or pelvic pathology. Additionally, the patient is scheduling a colonoscopy in the next 1-2 months.     I will follow up with the patient once the imaging results are available.                    Relevant Orders               Comprehensive metabolic panel              CBC and Auto Differential              Amylase              Lipase              CT abdomen pelvis w IV contrast        Other Visit Diagnoses                     Immunization counseling                  Patient was staffed with attending physician Dr. Hicks.  Rach Zee DO  Family Medicine, PGY-3                            Attestation signed by Celia Hicks MD PhD at 9/20/2024 11:42 AM     I reviewed and examined the patient. I was present for the key exam elements, and I fully participated in the patient's care. I discussed the management of the care with the resident. I have personally reviewed the pertinent labs and imaging, as well as recent notes, with the patient. I have reviewed the note above and agree with the resident's medical decision making as documented in the resident's note.        Agree with the rest of the plan outlined by resident physician. No red flags.      The patient understands and agrees to the assessment and plan of care. Patient has also agreed to follow up and comply with the treatment and evaluation as recommended today. Patient was instructed to call the office at [phone] should questions arise regarding their treatment or care.     Celia Hicks MD, PhD  [facility]  [place] [address]              Inactive Attestations                         Attestation signed by Celia Hicks MD PhD at 9/20/2024 11:30 AM to a previous version     I reviewed and examined the patient. I was present for the key exam elements, and I fully participated in the patient's care. I discussed the management of the care with the resident. I have personally reviewed the pertinent labs and imaging, as well as recent notes, with the patient. I have reviewed the note above and agree with the resident's medical decision making as documented in the  resident's note, in addition to the following comments / findings: Emergency Dept and 911/EMS precautions discussed and reviewed with patient.        Agree with the rest of the plan outlined by resident physician. No red flags.      The patient understands and agrees to the assessment and plan of care. Patient has also agreed to follow up and comply with the treatment and evaluation as recommended today. Patient was instructed to call the office at [phone] should questions arise regarding their treatment or care.     Celia Hicks MD, PhD  [facility]  [facility] [place] [address]                       New patient to our practice.  64 years old.  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  Single.  No pregnancies no gynecologic surgeries     And a pause was age 36     He reviewed her current meds.     She has had 2 episodes of left lower quadrant pain.  First 1 was 3 months ago was mild she did not do anything about it but then 3 weeks ago and last about 6 hours had some associated vomiting did see her primary care physician.  I reviewed notes from her primary care physician and the imaging including CT scan and MRI.  It is described as a simple unilocular cyst but it is 8 cm in size.  We are uncertain if this is what is causing her symptoms but our first priority is to rule out anything malignant.  I will order a Ca1 2 5.  Have her follow-up in 2 to 4 weeks.  If the Ca1 2 5 is normal we will have options including observation and repeating imaging in 3 months versus surgical intervention to remove the cyst if it is causing symptoms                 Review of Systems   Genitourinary:  Positive for pelvic pain.            Objective  Physical Exam        Assessment/Plan  2 episodes of left lower quadrant pain.  Today she is doing well.  Imaging reviewed.  MRI shows 8 cm unilocular simple cyst.  Obtain Ca1 2 5 and  then follow-up in 2 weeks.  If symptoms worsen notify me sooner        Randall Morrison MD 10/08/24 9:44 AM               Ca1 2 5 is normal  Status: Final result       Visible to patient: Yes (seen)       Next appt: 10/23/2024 at 10:45 AM in Radiology ([facility])       Dx: Pain in female pelvis; Left ovarian c...    2 Result Notes                Component  Ref Range & Units2 wk ago  Cancer   0.0 - 30.2 U/mL7.3  Resulting [facility]              Narrative  Performed by: [facility]   testing is performed by chemiluminescent immunoassay using the Siem[employer] Local Plant Source. Values obtained with different analytic methods cannot be used interchangeably.     Serum  measurement is intended for use as an aid in monitoring patients previously treated for ovarian cancer. This assay is not intended for screening or diagnosis of cancer in the general population. The results must not be used as the sole means for clinical diagnosis or patient management decisions.  Specimen Collected: 10/08/24 10:02     Wants to proceed with surgical removal.  Plan would be laparoscopy and ideally would like to remove both adnexa but main priority is to remove the cyst and relieve her pain.  Reviewed surgery risk benefits complications and recovery.  No medical allergies.  No history of any previous gynecologic or abdominal surgery     Pelvic pain, left ovarian cyst.  For laparoscopic BSO, possible ovarian cystectomy.  Has colonoscopy on 4 November     Pelvic Pain  The patient's primary symptoms include pelvic pain.         Review of Systems   Genitourinary:  Positive for pelvic pain.            Objective  Physical Exam  Constitutional:       Appearance: Normal appearance. She is normal weight.   Neurological:      Mental Status: She is alert.               Assessment/Plan  Pain in female pelvis.  Left ovarian cyst.  Normal Ca1 2 5.  Pain is affecting her quality of life.  Would like to have it removed.  Plan is laparoscopy and ideally  bilateral salpingo-oophorectomy, at a minimum excision of cyst.  Reviewed surgery risk benefits complications recovery        Randall Morrison MD 10/22/24 1:54 PM             .     Past Medical History  Past Medical History:   Diagnosis Date    Anxiety     Daytime somnolence     Deviated nasal septum     GERD (gastroesophageal reflux disease)     Hiatal hernia     Hyperlipidemia     Hypertension     Left ovarian cyst     Lung nodules     DAMIÁN (obstructive sleep apnea)     Pain in female pelvis     Palpitations     Thyroid nodule        Surgical History  Past Surgical History:   Procedure Laterality Date    OTHER SURGICAL HISTORY  01/17/2019    Shoulder surgery        Social History  She reports that she has never smoked. She has never used smokeless tobacco. She reports that she does not currently use alcohol. She reports that she does not use drugs.    Family History  Family History   Problem Relation Name Age of Onset    Hyperlipidemia Mother      Coronary artery disease Father      Hyperlipidemia Sister      Hyperlipidemia Brother          Allergies  Adhesive tape-silicones    Review of Systems     Physical Exam     Last Recorded Vitals  There were no vitals taken for this visit.    Relevant Results             Assessment/Plan   Assessment & Plan  Left ovarian cyst    Pain in female pelvis      For laparoscopic removal of ovarian cyst, possible bilateral salpingo-oophorectomy       I spent 10 minutes in the professional and overall care of this patient.      Randall Morrison MD

## 2024-11-14 ENCOUNTER — ANESTHESIA (OUTPATIENT)
Dept: OPERATING ROOM | Facility: HOSPITAL | Age: 64
End: 2024-11-14
Payer: MEDICARE

## 2024-11-14 ENCOUNTER — HOSPITAL ENCOUNTER (OUTPATIENT)
Facility: HOSPITAL | Age: 64
Setting detail: OUTPATIENT SURGERY
Discharge: HOME | End: 2024-11-14
Attending: OBSTETRICS & GYNECOLOGY | Admitting: OBSTETRICS & GYNECOLOGY
Payer: MEDICARE

## 2024-11-14 VITALS
OXYGEN SATURATION: 97 % | HEART RATE: 72 BPM | TEMPERATURE: 97.7 F | RESPIRATION RATE: 15 BRPM | DIASTOLIC BLOOD PRESSURE: 76 MMHG | HEIGHT: 66 IN | BODY MASS INDEX: 26.22 KG/M2 | WEIGHT: 163.14 LBS | SYSTOLIC BLOOD PRESSURE: 128 MMHG

## 2024-11-14 DIAGNOSIS — R10.2 PAIN IN FEMALE PELVIS: ICD-10-CM

## 2024-11-14 DIAGNOSIS — N83.202 LEFT OVARIAN CYST: ICD-10-CM

## 2024-11-14 PROCEDURE — 7100000002 HC RECOVERY ROOM TIME - EACH INCREMENTAL 1 MINUTE: Performed by: OBSTETRICS & GYNECOLOGY

## 2024-11-14 PROCEDURE — A58661 PR LAP,RMV  ADNEXAL STRUCTURE: Performed by: NURSE ANESTHETIST, CERTIFIED REGISTERED

## 2024-11-14 PROCEDURE — A58661 PR LAP,RMV  ADNEXAL STRUCTURE: Performed by: ANESTHESIOLOGY

## 2024-11-14 PROCEDURE — 2500000001 HC RX 250 WO HCPCS SELF ADMINISTERED DRUGS (ALT 637 FOR MEDICARE OP): Performed by: OBSTETRICS & GYNECOLOGY

## 2024-11-14 PROCEDURE — 2500000004 HC RX 250 GENERAL PHARMACY W/ HCPCS (ALT 636 FOR OP/ED): Performed by: NURSE ANESTHETIST, CERTIFIED REGISTERED

## 2024-11-14 PROCEDURE — 7100000001 HC RECOVERY ROOM TIME - INITIAL BASE CHARGE: Performed by: OBSTETRICS & GYNECOLOGY

## 2024-11-14 PROCEDURE — 88307 TISSUE EXAM BY PATHOLOGIST: CPT | Performed by: PATHOLOGY

## 2024-11-14 PROCEDURE — 3600000003 HC OR TIME - INITIAL BASE CHARGE - PROCEDURE LEVEL THREE: Performed by: OBSTETRICS & GYNECOLOGY

## 2024-11-14 PROCEDURE — 58661 LAPAROSCOPY REMOVE ADNEXA: CPT | Performed by: OBSTETRICS & GYNECOLOGY

## 2024-11-14 PROCEDURE — 3700000002 HC GENERAL ANESTHESIA TIME - EACH INCREMENTAL 1 MINUTE: Performed by: OBSTETRICS & GYNECOLOGY

## 2024-11-14 PROCEDURE — 3700000001 HC GENERAL ANESTHESIA TIME - INITIAL BASE CHARGE: Performed by: OBSTETRICS & GYNECOLOGY

## 2024-11-14 PROCEDURE — 2500000004 HC RX 250 GENERAL PHARMACY W/ HCPCS (ALT 636 FOR OP/ED): Performed by: ANESTHESIOLOGY

## 2024-11-14 PROCEDURE — 3600000008 HC OR TIME - EACH INCREMENTAL 1 MINUTE - PROCEDURE LEVEL THREE: Performed by: OBSTETRICS & GYNECOLOGY

## 2024-11-14 PROCEDURE — 2720000007 HC OR 272 NO HCPCS: Performed by: OBSTETRICS & GYNECOLOGY

## 2024-11-14 PROCEDURE — 7100000010 HC PHASE TWO TIME - EACH INCREMENTAL 1 MINUTE: Performed by: OBSTETRICS & GYNECOLOGY

## 2024-11-14 PROCEDURE — 7100000009 HC PHASE TWO TIME - INITIAL BASE CHARGE: Performed by: OBSTETRICS & GYNECOLOGY

## 2024-11-14 PROCEDURE — 2500000005 HC RX 250 GENERAL PHARMACY W/O HCPCS: Performed by: ANESTHESIOLOGY

## 2024-11-14 PROCEDURE — 88307 TISSUE EXAM BY PATHOLOGIST: CPT | Mod: TC,GEALAB,WESLAB | Performed by: OBSTETRICS & GYNECOLOGY

## 2024-11-14 PROCEDURE — 2500000004 HC RX 250 GENERAL PHARMACY W/ HCPCS (ALT 636 FOR OP/ED): Performed by: OBSTETRICS & GYNECOLOGY

## 2024-11-14 RX ORDER — MEPERIDINE HYDROCHLORIDE 25 MG/ML
12.5 INJECTION INTRAMUSCULAR; INTRAVENOUS; SUBCUTANEOUS EVERY 10 MIN PRN
Status: DISCONTINUED | OUTPATIENT
Start: 2024-11-14 | End: 2024-11-14 | Stop reason: HOSPADM

## 2024-11-14 RX ORDER — LIDOCAINE HYDROCHLORIDE 10 MG/ML
INJECTION, SOLUTION EPIDURAL; INFILTRATION; INTRACAUDAL; PERINEURAL AS NEEDED
Status: DISCONTINUED | OUTPATIENT
Start: 2024-11-14 | End: 2024-11-14

## 2024-11-14 RX ORDER — KETOROLAC TROMETHAMINE 30 MG/ML
INJECTION, SOLUTION INTRAMUSCULAR; INTRAVENOUS AS NEEDED
Status: DISCONTINUED | OUTPATIENT
Start: 2024-11-14 | End: 2024-11-14

## 2024-11-14 RX ORDER — SODIUM CHLORIDE, SODIUM LACTATE, POTASSIUM CHLORIDE, CALCIUM CHLORIDE 600; 310; 30; 20 MG/100ML; MG/100ML; MG/100ML; MG/100ML
100 INJECTION, SOLUTION INTRAVENOUS CONTINUOUS
Status: DISCONTINUED | OUTPATIENT
Start: 2024-11-14 | End: 2024-11-14 | Stop reason: HOSPADM

## 2024-11-14 RX ORDER — ONDANSETRON HYDROCHLORIDE 2 MG/ML
INJECTION, SOLUTION INTRAVENOUS AS NEEDED
Status: DISCONTINUED | OUTPATIENT
Start: 2024-11-14 | End: 2024-11-14

## 2024-11-14 RX ORDER — DIPHENHYDRAMINE HYDROCHLORIDE 50 MG/ML
12.5 INJECTION INTRAMUSCULAR; INTRAVENOUS ONCE AS NEEDED
Status: DISCONTINUED | OUTPATIENT
Start: 2024-11-14 | End: 2024-11-14 | Stop reason: HOSPADM

## 2024-11-14 RX ORDER — DROPERIDOL 2.5 MG/ML
0.62 INJECTION, SOLUTION INTRAMUSCULAR; INTRAVENOUS ONCE AS NEEDED
Status: DISCONTINUED | OUTPATIENT
Start: 2024-11-14 | End: 2024-11-14 | Stop reason: HOSPADM

## 2024-11-14 RX ORDER — MIDAZOLAM HYDROCHLORIDE 1 MG/ML
INJECTION INTRAMUSCULAR; INTRAVENOUS AS NEEDED
Status: DISCONTINUED | OUTPATIENT
Start: 2024-11-14 | End: 2024-11-14

## 2024-11-14 RX ORDER — METOPROLOL TARTRATE 1 MG/ML
INJECTION, SOLUTION INTRAVENOUS AS NEEDED
Status: DISCONTINUED | OUTPATIENT
Start: 2024-11-14 | End: 2024-11-14

## 2024-11-14 RX ORDER — CELECOXIB 400 MG/1
400 CAPSULE ORAL ONCE
Status: COMPLETED | OUTPATIENT
Start: 2024-11-14 | End: 2024-11-14

## 2024-11-14 RX ORDER — FENTANYL CITRATE 50 UG/ML
INJECTION, SOLUTION INTRAMUSCULAR; INTRAVENOUS AS NEEDED
Status: DISCONTINUED | OUTPATIENT
Start: 2024-11-14 | End: 2024-11-14

## 2024-11-14 RX ORDER — ACETAMINOPHEN 325 MG/1
975 TABLET ORAL ONCE
Status: COMPLETED | OUTPATIENT
Start: 2024-11-14 | End: 2024-11-14

## 2024-11-14 RX ORDER — ALBUTEROL SULFATE 0.83 MG/ML
2.5 SOLUTION RESPIRATORY (INHALATION) ONCE AS NEEDED
Status: DISCONTINUED | OUTPATIENT
Start: 2024-11-14 | End: 2024-11-14 | Stop reason: HOSPADM

## 2024-11-14 RX ORDER — GABAPENTIN 300 MG/1
600 CAPSULE ORAL ONCE
Status: COMPLETED | OUTPATIENT
Start: 2024-11-14 | End: 2024-11-14

## 2024-11-14 RX ORDER — SODIUM CHLORIDE, SODIUM LACTATE, POTASSIUM CHLORIDE, CALCIUM CHLORIDE 600; 310; 30; 20 MG/100ML; MG/100ML; MG/100ML; MG/100ML
20 INJECTION, SOLUTION INTRAVENOUS CONTINUOUS
Status: DISCONTINUED | OUTPATIENT
Start: 2024-11-14 | End: 2024-11-14 | Stop reason: HOSPADM

## 2024-11-14 RX ORDER — PROPOFOL 10 MG/ML
INJECTION, EMULSION INTRAVENOUS AS NEEDED
Status: DISCONTINUED | OUTPATIENT
Start: 2024-11-14 | End: 2024-11-14

## 2024-11-14 RX ORDER — ONDANSETRON HYDROCHLORIDE 2 MG/ML
4 INJECTION, SOLUTION INTRAVENOUS ONCE AS NEEDED
Status: DISCONTINUED | OUTPATIENT
Start: 2024-11-14 | End: 2024-11-14 | Stop reason: HOSPADM

## 2024-11-14 RX ORDER — OXYCODONE HYDROCHLORIDE 5 MG/1
5 TABLET ORAL EVERY 4 HOURS PRN
Status: DISCONTINUED | OUTPATIENT
Start: 2024-11-14 | End: 2024-11-14 | Stop reason: HOSPADM

## 2024-11-14 RX ORDER — ROCURONIUM BROMIDE 10 MG/ML
INJECTION, SOLUTION INTRAVENOUS AS NEEDED
Status: DISCONTINUED | OUTPATIENT
Start: 2024-11-14 | End: 2024-11-14

## 2024-11-14 RX ORDER — BUPIVACAINE HYDROCHLORIDE 5 MG/ML
INJECTION, SOLUTION EPIDURAL; INTRACAUDAL AS NEEDED
Status: DISCONTINUED | OUTPATIENT
Start: 2024-11-14 | End: 2024-11-14 | Stop reason: HOSPADM

## 2024-11-14 RX ADMIN — ACETAMINOPHEN 975 MG: 325 TABLET ORAL at 08:35

## 2024-11-14 RX ADMIN — GABAPENTIN 300 MG: 300 CAPSULE ORAL at 08:35

## 2024-11-14 RX ADMIN — CELECOXIB 400 MG: 400 CAPSULE ORAL at 08:35

## 2024-11-14 RX ADMIN — Medication 4 L/MIN: at 10:50

## 2024-11-14 RX ADMIN — SODIUM CHLORIDE, POTASSIUM CHLORIDE, SODIUM LACTATE AND CALCIUM CHLORIDE 20 ML/HR: 600; 310; 30; 20 INJECTION, SOLUTION INTRAVENOUS at 08:35

## 2024-11-14 SDOH — HEALTH STABILITY: MENTAL HEALTH: CURRENT SMOKER: 0

## 2024-11-14 ASSESSMENT — PAIN SCALES - GENERAL
PAINLEVEL_OUTOF10: 0 - NO PAIN

## 2024-11-14 ASSESSMENT — PAIN - FUNCTIONAL ASSESSMENT
PAIN_FUNCTIONAL_ASSESSMENT: 0-10

## 2024-11-14 NOTE — OP NOTE
Date: 2024  OR Location: [facility] OR    Name: Radha Gold, : 1960, Age: 64 y.o., MRN: [id number], Sex: female    Diagnosis  Pre-op Diagnosis      * Left ovarian cyst [N83.202]     * Pain in female pelvis [R10.2] Post-op Diagnosis     * Left ovarian cyst [N83.202]     * Pain in female pelvis [R10.2]     Procedures  LAPAROSCOPIC SALPINGO-OOPHORECTOMY  84930 - RI LAPAROSCOPY W/RMVL ADNEXAL STRUCTURES  Findings laparoscopy were and atrophic right adnexa there was a large 8 cm left ovarian cyst in the cul-de-sac with evidence of torsion.  Cyst with smooth-walled mobile fluid-filled and appeared benign grossly    Under general anesthetic dorsolithotomy position patient prepped draped usual manner.  In-N-Out catheterization done of the bladder.  Sponge stick placed into an atrophic vagina with a little bit of bleeding because of the atrophy.    Marcaine used for skin incisions through an infraumbilical incision Veress needle inserted peritoneal cavity insufflated 5 mm trocar inserted laparoscope used to visualize the pelvis NuPrep findings as described above large 8 cm fluid-filled mobile smooth-walled left ovarian cyst in the cul-de-sac.  10 mm port placed suprapubically 5 mm port left lower quadrant under direct visualization    Both adnexa were elevated ureters identified low down on the pelvic sidewall    30 on the right side the right infundibulopelvic ligament was grasped cauterized and cut the mesovarium was freed up and then the right utero-ovarian pedicle grasped cauterized and cut freeing up the right adnexa which was removed laparoscopically    Turning our attention to the left side after identifying ureter.  Incision made into this fluid-filled cyst to allow it to decompress.  Elevating the left adnexa the LigaSure device was used to free up the left infundibulopelvic ligament mesovarium and the left utero-ovarian pedicle freeing up the mass.  Mass was then also removed laparoscopically.    Pelvis  irrigated and suction hemostasis excellent minimal blood loss less than 10 cc.    Peritoneal cavity was allowed to exsufflated.  Instruments removed.  Fascial stitch of 0 Vicryl placed suprapubically.  Skin incisions reapproximated.  Transferred to recovery room in stable condition.  NuPrep the rest of the abdomen pelvis appeared normal including the appendix and uterus.  Counts correct.  Specimen of bilateral adnexa with left ovarian cyst    Surgeons      * Randall Morrison - Primary    Resident/Fellow/Other Assistant:  Surgeons and Role:  * No surgeons found with a matching role *    Staff:   Circulator: Marcia  Scrub Person: Isis  Surgical Assistant: Hodan Joyceub Person: Thais    Anesthesia Staff: Anesthesiologist: Brian Chavez MD  CRNA: ERIN Alvarado-ADILIA    Procedure Summary  Anesthesia: General  ASA: III  Estimated Blood Loss: 5mL  Intra-op Medications:   Administrations occurring from 0920 to 1035 on 11/14/24:   Medication Name Total Dose   bupivacaine PF 0.5 % (Marcaine) 0.5 % (5 mg/mL) injection 2 mL   dexAMETHasone (Decadron) injection 4 mg/mL 4 mg   fentaNYL (Sublimaze) injection 50 mcg/mL 200 mcg   ketorolac (Toradol) injection 30 mg 15 mg   lidocaine PF (Xylocaine-MPF) local injection 1 % 50 mg   metoprolol tartrate (Lopressor) injection 2.5 mg   midazolam PF (Versed) injection 1 mg/mL 2 mg   ondansetron (Zofran) 2 mg/mL injection 4 mg   propofol (Diprivan) injection 10 mg/mL 160 mg   rocuronium (ZeMuron) 50 mg/5 mL injection 50 mg              Anesthesia Record               Intraprocedure I/O Totals          Output    Est. Blood Loss 5 mL    Total Output 5 mL          Specimen:   ID Type Source Tests Collected by Time   1 : BILATERAL TUBES AND OVARIES Tissue FALLOPIAN TUBE AND OVARY BILATERAL SALPINGO-OOPHORECTOMY SURGICAL PATHOLOGY EXAM Randall Morrison MD 11/14/2024 1031                 Procedure Details:  The patient was seen in the preoperative area. The site of surgery was properly  noted/marked if necessary per policy. The patient has been actively warmed in preoperative area. Preoperative antibiotics are not indicated. Venous thrombosis prophylaxis have been ordered including bilateral sequential compression devices    Findings: 8 cm fluid-filled smooth-walled benign-appearing left ovarian cyst.  Atrophic right ovary    Complications:  None; patient tolerated the procedure well.     Disposition: PACU - hemodynamically stable.  Condition: stable

## 2024-11-14 NOTE — ANESTHESIA POSTPROCEDURE EVALUATION
Patient: Radha Gold    Procedure Summary       Date: 11/14/24 Room / Location: [facility] OR 07 / [facility]    Anesthesia Start: 0956 Anesthesia Stop: 1057    Procedure: LAPAROSCOPIC SALPINGO-OOPHORECTOMY (Bilateral) Diagnosis:       Left ovarian cyst      Pain in female pelvis      (Left ovarian cyst [N83.202])      (Pain in female pelvis [R10.2])    Surgeons: Randall Morrison MD Responsible Provider: Brian Chavez MD    Anesthesia Type: general ASA Status: 3            Anesthesia Type: general    Vitals Value Taken Time   BP See vitals 11/14/24 1100   Temp  11/14/24 1100   Pulse  11/14/24 1100   Resp  11/14/24 1100   SpO2  11/14/24 1100       Anesthesia Post Evaluation    Patient location during evaluation: PACU  Patient participation: complete - patient participated  Level of consciousness: awake  Pain management: adequate  Multimodal analgesia pain management approach  Airway patency: patent  Two or more strategies used to mitigate risk of obstructive sleep apnea  Cardiovascular status: acceptable  Respiratory status: acceptable  Hydration status: acceptable  Postoperative Nausea and Vomiting: none        No notable events documented.

## 2024-11-14 NOTE — DISCHARGE INSTRUCTIONS
Activity:   You should rest on the day of surgery  You may not return to work until cleared by your surgeon.   You may have light bleeding or spotting for a few weeks   Use only sanitary pads for bleeding, do not use tampons  Please abstain from intercourse until you are cleared by your surgeon  Please do not fully submerge in water (pool/hot tub/bath tub) even in your own home. Shower is fine.  Do not drive for 24 hours after anesthesia, while taking narcotic pain management, and while requiring short interval Tylenol/Ibuprofen for pain   Do not lift anything greater than 10 pounds until after your 2 week post operative visit in the office  Do not consume alcohol for 24 hours after anesthesia or while using any narcotic pain medication     Anesthesia:  Drink small amounts of liquids initially and then slowly increase your intake of food. Drinking fluids will keep your bowels regular.   Avoid foods that are sweet, spicy or hard to digest today.  If you feel nauseated, rest your stomach for one hour, and then try drinking clear liquids again.  You may take a stool softener or miralax/milk of magnesia to help with constipation that may occur after anesthesia.  Please make sure a responsible adult is with you for at least 24 hours after surgery and do not drive or make important decisions during this time. Anesthesia may affect your judgment, coordination, and reaction time.     Pain:  If you experience any post-op pain, pain can be managed by taking Ibuprofen and/or Tylenol. You may additionally use heat or cool packs to alleviate discomfort     Follow up:  Follow up with your surgeon in the office 6 weeks after your procedure for an incision check     When to call your provider:  Vaginal bleeding that is more than a normal period that doesn't taper down.  Bleeding through 1 sanitary pad an hour.  Any clots the size of an egg or bigger.  Fever of 100.4 or higher with chills.  Unusual or foul smelling  discharge.  Worsening abdominal pain.  Always call the office to be connected with your surgeon or the physician on call for the practice. If your call is during office hours (Monday through Friday 9:00 AM to 4:00 PM) press 1 to be connected to the . If you are calling after hours you will be transferred to our answering service and they will connect you with the physician on call.

## 2024-11-14 NOTE — ANESTHESIA PROCEDURE NOTES
Airway  Date/Time: 11/14/2024 10:07 AM  Urgency: elective    Airway not difficult    Staffing  Performed: CRNA   Authorized by: Brian Chavez MD    Performed by: ERIN Alvarado-ADILIA  Patient location during procedure: OR    Indications and Patient Condition  Indications for airway management: anesthesia  Spontaneous Ventilation: absent  Sedation level: deep  Preoxygenated: yes  Patient position: sniffing  Mask difficulty assessment: 1 - vent by mask    Final Airway Details  Final airway type: endotracheal airway      Successful airway: ETT  Cuffed: yes   Successful intubation technique: video laryngoscopy  Facilitating devices/methods: intubating stylet  Endotracheal tube insertion site: oral  Blade size: #3  ETT size (mm): 7.5  Cormack-Lehane Classification: grade I - full view of glottis  Number of attempts at approach: 1

## 2024-11-22 LAB
LABORATORY COMMENT REPORT: NORMAL
PATH REPORT.FINAL DX SPEC: NORMAL
PATH REPORT.GROSS SPEC: NORMAL
PATH REPORT.RELEVANT HX SPEC: NORMAL
PATH REPORT.TOTAL CANCER: NORMAL
RESIDENT REVIEW: NORMAL

## 2024-11-26 ENCOUNTER — APPOINTMENT (OUTPATIENT)
Dept: OPERATING ROOM | Facility: HOSPITAL | Age: 64
End: 2024-11-26
Payer: MEDICARE

## 2024-12-03 ENCOUNTER — TELEPHONE (OUTPATIENT)
Dept: PRIMARY CARE | Facility: CLINIC | Age: 64
End: 2024-12-03
Payer: MEDICARE

## 2024-12-03 NOTE — TELEPHONE ENCOUNTER
Patient called and left a message, wanted to talk to a nurse or Dr Griffith about her mother.  [phone]

## 2024-12-13 ENCOUNTER — APPOINTMENT (OUTPATIENT)
Dept: PRIMARY CARE | Facility: CLINIC | Age: 64
End: 2024-12-13
Payer: MEDICARE

## 2024-12-13 ENCOUNTER — TELEPHONE (OUTPATIENT)
Dept: PRIMARY CARE | Facility: CLINIC | Age: 64
End: 2024-12-13

## 2024-12-13 DIAGNOSIS — M54.9 BACK PAIN, UNSPECIFIED BACK LOCATION, UNSPECIFIED BACK PAIN LATERALITY, UNSPECIFIED CHRONICITY: Primary | ICD-10-CM

## 2024-12-13 NOTE — TELEPHONE ENCOUNTER
Patient is having back issues and above hip.  She did see Dr Wilson in the past. Did not want to go back there.  I told her I figured you would refer her to another ortho but would check.  She has had injections as well.

## 2024-12-31 ENCOUNTER — APPOINTMENT (OUTPATIENT)
Dept: CARDIOLOGY | Facility: CLINIC | Age: 64
End: 2024-12-31
Payer: MEDICARE

## 2025-01-10 ENCOUNTER — APPOINTMENT (OUTPATIENT)
Dept: OBSTETRICS AND GYNECOLOGY | Facility: CLINIC | Age: 65
End: 2025-01-10
Payer: MEDICARE

## 2025-01-10 VITALS
BODY MASS INDEX: 26.68 KG/M2 | HEIGHT: 66 IN | WEIGHT: 166 LBS | SYSTOLIC BLOOD PRESSURE: 126 MMHG | DIASTOLIC BLOOD PRESSURE: 80 MMHG

## 2025-01-10 DIAGNOSIS — R10.2 PAIN IN FEMALE PELVIS: ICD-10-CM

## 2025-01-10 DIAGNOSIS — D27.1 SEROUS CYSTADENOMA OF LEFT OVARY: Primary | ICD-10-CM

## 2025-01-10 PROCEDURE — 3079F DIAST BP 80-89 MM HG: CPT | Performed by: OBSTETRICS & GYNECOLOGY

## 2025-01-10 PROCEDURE — 3008F BODY MASS INDEX DOCD: CPT | Performed by: OBSTETRICS & GYNECOLOGY

## 2025-01-10 PROCEDURE — 1159F MED LIST DOCD IN RCRD: CPT | Performed by: OBSTETRICS & GYNECOLOGY

## 2025-01-10 PROCEDURE — 3074F SYST BP LT 130 MM HG: CPT | Performed by: OBSTETRICS & GYNECOLOGY

## 2025-01-10 PROCEDURE — 99213 OFFICE O/P EST LOW 20 MIN: CPT | Performed by: OBSTETRICS & GYNECOLOGY

## 2025-01-10 NOTE — PROGRESS NOTES
Subjective   Patient ID: Radha Gold is a 64 y.o. female who presents for Post-op.  Following up after laparoscopic removal of a large ovarian mass.  Pathology benign consistent with serous cystadenoma.  Has resumed all normal activities.  No postop issues.  Has follow-up for annual exam    Results  Surgical Pathology Exam (Order [id number])  Result Information    Status Priority Source  Final result (11/22/2024 1523) Routine FALLOPIAN TUBE AND OVARY BILATERAL SALPINGO-OOPHORECTOMY       Surgical Pathology Exam: Result Notes     Sarahy Marie LPN  11/25/2024 10:29 AM EST       Radha notified of benign pathology.   Randall Morrison MD  11/22/2024  7:08 PM EST       Path benign         Case Report                   Case Report  Surgical Pathology                                Case: [id number]                                  Authorizing Provider:  Randall Morrison MD            Collected:           11/14/2024 1031              Ordering Location:     [facility]   Received:            11/14/2024 1341                                     OR                                                                          Pathologist:           Russ Mcnair MD                                                      Specimen:    FALLOPIAN TUBE AND OVARY BILATERAL SALPINGO-OOPHORECTOMY, BILATERAL TUBES AND OVARIES         FINAL DIAGNOSIS  A. RIGHT AND LEFT OVARIES AND FALLOPIAN TUBES, BILATERAL SALPINGO-OOPHORECTOMY:  -- OVARY #1 WITH SEROUS CYSTADENOMA WITH PREDOMINANTLY ATTENUATED LINING.  -- OVARY #2 WITH NO SIGNIFICANT PATHOLOGIC FINDINGS.  -- BILATERAL FALLOPIAN TUBES WITH NO SIGNIFICANT PATHOLOGIC FINDINGS.   Electronically signed by Russ Mcnair MD on 11/22/2024 at 1523    By the signature on this report, the individual or group listed as making the Final Interpretation/Diagnosis certifies that they have reviewed this case.     Resident Review  The gross and/or microscopic findings were reviewed in  "conjunction with pathology resident, Giselle Amezcua MD.        Clinical History  Pre-op diagnosis:  Left ovarian cyst [N83.202]  Pain in female pelvis [R10.2]    Gross Description  A: Received in formalin labeled with the patient's name and hospital number and \"bilateral tubes and ovaries\", are 2 undesignated ovaries with attached fallopian tubes. Ovary #1 is cystic, disrupted, and reapproximated to measure 4.7 x 4.5 x 2.5 cm and weigh 9 g. The approximate outer surfaces are predominantly smooth and inked black with 1 slightly raised gray discolored area measuring 0.5 x 0.4 x 0.2 cm. The cyst, 4.5 cm in greatest dimension, is unilocular, smooth-walled, and devoid of contents. The wall is smooth, thin, and uninvolved ovarian parenchyma cannot be grossly identified. Fallopian tube #1 measures 3.8 cm in length by 0.4 cm in diameter. The serosa is purple-tan and glistening with scant fibrous adhesions.  The fimbriated end is unremarkable. On sectioning the lumen is patent.     Fallopian tube #2 measures 3.5 cm in length by 0.4 cm in diameter. The serosa is pink-tan, smooth, and glistening. The fimbriated end is unremarkable. The cut surface reveals a patent lumen. Ovary #2, 3.0 x 1.3 x 0.7 cm, is firm with a gray-white cerebriform outer surface. The cut surface is gray-tan and soft. No lesion is identified. A photograph is taken. Representative sections are submitted in 8 cassettes.  [facility]     Summary of Cassettes:  SpecimenLabelSite  A                        1           ovary #1 including entire raised gray discolored area                            2-3        ovary #1 including area suggestive of uninvolved ovarian parenchyma                            4           fallopian tube #1 (entire fimbriated end and representative cross-sections)                            5           fallopian tube #2, entirely submitted                            6           ovary #2, entirely submitted     Gross dissection performed " at:  [facility]  [address], [place] [address]  Phone: [phone]  Fax: [phone]    Resolution of pelvic pain.  Laparoscopic removal of both adnexa.  Pathology benign.        Review of Systems   All other systems reviewed and are negative.      Objective   Physical Exam  Constitutional:       Appearance: Normal appearance. She is normal weight.   Neurological:      Mental Status: She is alert.         Assessment/Plan   Serocystadenoma left ovary.  Laparoscopic removal with adnexa.  Follow-up at some point this year for annual exam and mammography which she is overdue         Randall Morrison MD 01/10/25 11:15 AM

## 2025-01-13 ENCOUNTER — APPOINTMENT (OUTPATIENT)
Dept: ORTHOPEDIC SURGERY | Facility: CLINIC | Age: 65
End: 2025-01-13
Payer: MEDICARE

## 2025-01-13 DIAGNOSIS — M54.50 LOW BACK PAIN, UNSPECIFIED BACK PAIN LATERALITY, UNSPECIFIED CHRONICITY, UNSPECIFIED WHETHER SCIATICA PRESENT: ICD-10-CM

## 2025-01-14 ENCOUNTER — APPOINTMENT (OUTPATIENT)
Dept: ORTHOPEDIC SURGERY | Facility: CLINIC | Age: 65
End: 2025-01-14
Payer: MEDICARE

## 2025-01-14 ENCOUNTER — HOSPITAL ENCOUNTER (OUTPATIENT)
Dept: RADIOLOGY | Facility: CLINIC | Age: 65
Discharge: HOME | End: 2025-01-14
Payer: MEDICARE

## 2025-01-14 DIAGNOSIS — G95.9 MYELOPATHY (MULTI): ICD-10-CM

## 2025-01-14 DIAGNOSIS — G89.29 CHRONIC BILATERAL LOW BACK PAIN WITHOUT SCIATICA: ICD-10-CM

## 2025-01-14 DIAGNOSIS — M54.50 LOW BACK PAIN, UNSPECIFIED BACK PAIN LATERALITY, UNSPECIFIED CHRONICITY, UNSPECIFIED WHETHER SCIATICA PRESENT: ICD-10-CM

## 2025-01-14 DIAGNOSIS — M25.551 RIGHT HIP PAIN: Primary | ICD-10-CM

## 2025-01-14 DIAGNOSIS — M54.50 CHRONIC BILATERAL LOW BACK PAIN WITHOUT SCIATICA: ICD-10-CM

## 2025-01-14 PROCEDURE — 72100 X-RAY EXAM L-S SPINE 2/3 VWS: CPT

## 2025-01-14 PROCEDURE — 99204 OFFICE O/P NEW MOD 45 MIN: CPT | Performed by: STUDENT IN AN ORGANIZED HEALTH CARE EDUCATION/TRAINING PROGRAM

## 2025-01-14 PROCEDURE — 72100 X-RAY EXAM L-S SPINE 2/3 VWS: CPT | Performed by: RADIOLOGY

## 2025-01-14 PROCEDURE — G2211 COMPLEX E/M VISIT ADD ON: HCPCS | Performed by: STUDENT IN AN ORGANIZED HEALTH CARE EDUCATION/TRAINING PROGRAM

## 2025-01-14 NOTE — PROGRESS NOTES
Orthopaedic Spine Surgery Clinic Note    Patient ID: Radha Gold is a 64 y.o. female.    Chief Complaint  Low back and bilateral hip pain (R>L), balance/incoordination      History of Present Illness  Ms. Gold is a pleasant 64-year-old female who presents for initial evaluation of chronic low back and bilateral hip (R>L) pain along with balance/incoordination issues.  She denies any inciting event or injury.  She states that her issues have been going on for several months to years.  She notes a recent history of severe left-sided pelvic pain that she had previously evaluated by an MRI pelvis.  She was found to have some cysts in her ovaries and subsequently had surgery to remove these.  The nature of her pain currently is very different from her prior sharp left-sided pelvic pain.  She currently reports pain in a bandlike distribution across her lower abdomen and pelvis.  At times this radiates into her buttocks and is associated with a heaviness.  She specifically notes pain in her right groin as well.  She reports worsening with activity including prolonged ambulation and deep forward bending.  She does occasionally have pain radiating from her right groin down to her right knee with some pain on the anterior side of her right kneecap.    She denies pain otherwise radiating into her lower extremities, apart from frequent muscle spasms of her lower extremities particularly at night.  Denies numbness or paresthesias.  Denies bowel or bladder control issues.  She does also specifically note a history of imbalance and incoordination with her lower extremities.  She states that she believes she has some symptoms of vertigo.  She specifically notes a history of vertiginous symptoms that sometimes occurs when she is even seated.  She denies any upper extremity numbness or paresthesias, dexterity issues.  She does endorse some occasional weakness with her upper extremities which she believes secondary to  osteoarthritis.    Prior treatments:  She has not had formal physical therapy recently, although she states she has had this in the remote past.  She has been very diligent doing home stretching at home.  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  She has been very diligent about deep tissue massage which she has found helpful    She does not take any medications for her discomfort.  She is unable to tolerate NSAIDs due to a history of hiatal hernia    Relevant PMH/PSH for spine    Past Medical History:   Diagnosis Date    Anxiety     Daytime somnolence     Deviated nasal septum     GERD (gastroesophageal reflux disease)     Hiatal hernia     Hyperlipidemia     Hypertension     Left ovarian cyst     Lung nodules     DAMIÁN (obstructive sleep apnea)     Pain in female pelvis     Palpitations     Thyroid nodule        Past Surgical History:   Procedure Laterality Date    OTHER SURGICAL HISTORY  01/17/2019    Shoulder surgery       Social History     Socioeconomic History    Marital status: Single   Tobacco Use    Smoking status: Never    Smokeless tobacco: Never   Vaping Use    Vaping status: Never Used   Substance and Sexual Activity    Alcohol use: Not Currently    Drug use: Never       Family History   Problem Relation Name Age of Onset    Hyperlipidemia Mother      Coronary artery disease Father      Hyperlipidemia Sister      Hyperlipidemia Brother         Allergies   Allergen Reactions    Adhesive Tape-Silicones Rash       Current Outpatient Medications   Medication Instructions    cholecalciferol (Vitamin D-3) 50 MCG (2000 UT) tablet 1 tablet, Every 24 hours    ergocalciferol (Vitamin D-2) 1.25 MG (58113 UT) capsule 1 capsule, Once Weekly    irbesartan (AVAPRO) 150 mg, oral, Daily    LORazepam (ATIVAN) 1 mg, Every 12 hours PRN    pantoprazole (PROTONIX) 40 mg, oral, 2 times daily,  Do not crush, chew, or split. / 2 tablets daily    PARoxetine (Paxil) 20 mg tablet 1 tablet, Daily         Vitals & Measurements  There were no vitals filed for this visit.     Ortho Exam  General: AO x 3, NAD  Cardio: examined extremities perfused by peripheral palpation  Resp: breathing unlabored  Gait: within normal limits, non-antalgic  Tenderness: Positive tenderness to palpation over the left PSIS    Upper Extremity  Right  Left  Deltoid   5/5  5/5  Biceps   5/5  5/5  Triceps   5/5  5/5  Wrist extension  5/5  5/5     4/5  4/5  Intrinsics  5/5  5/5      Lower Extremity  Right  Left  IP   4*/5  4*/5  Quadriceps  5/5  5/5  Tibialis anterior  5/5  5/5  EHL   5/5  5/5  Gastrocnemius  5/5  5/5  *Pain limited    Sensation: Normal to light touch throughout upper and lower extremities bilaterally.    Reflexes:  Right   Left  Bi  2+   2+  Tri  2+   2+  BR  2+  2+  Q  2+  1+  A   2+   2+    Ferguson: negative  IBR: Positive on left  Clonus: negative    Hip:  Negative log roll  Positive FADIR testing on right for internal groin pain    Diagnostic Results - Imaging    XR lumbar spine today, 1/14/25  Official read pending.  New upright AP and lateral radiographs of the lumbar spine were obtained in the office today.  These images are of good quality.  Demonstrated on coronal profile is a degenerative scoliosis, apex left.  There is some mild lateral listhesis at L3-4.  On sagittal profile there is a hyperlordotic lumbar spine.  There is disc degeneration with narrowing and vacuum disc effect at L5-S1.  There is a very subtle grade 1 spondylolisthesis at L4-5.  There is grade 1 retrolisthesis at L1-2.  No acute fracture or dislocation.      XR cervical spine, 12/13/21  FINDINGS:  There is no evidence of acute fracture identified. The vertebral  bodies are well aligned without evidence of subluxation. No  prevertebral soft tissue swelling is present.  Moderate disc space  narrowing and small marginal osteophytes are  present at the C5-6 and  C6-7 levels. Moderate facet degenerative changes are seen throughout  the cervical spine. Osseous neural foraminal narrowing is seen  throughout the cervical spine bilaterally.     IMPRESSION:  1.  No evidence of acute fracture.  2.  Degenerative changes, as above.        XR hip right, 10/4/2023  FINDINGS:  No acute fracture is identified. No dislocation is seen. There are no  lytic or blastic lesions. No radiopaque foreign bodies are noted.  Sacroiliac joints appear symmetric. There is no diastasis of the  pubic symphysis. There are degenerative changes involving both hips  including joint space narrowing, spurring, and subchondral sclerosis.      IMPRESSION:  No radiographic evidence of an acute fracture.      CT abdomen/pelvis, 9/25/2024  FINDINGS:  Lower chest: No focal consolidation or pleural effusion.      Liver: No mass.      Biliary: No intrahepatic or extrahepatic bile duct dilation. No  cholelithiasis.      Spleen: No mass. No splenomegaly.      Pancreas: No mass or duct dilation.      Adrenals: Normal.      Kidneys: No suspicious mass, calculus or hydronephrosis.      GI tract: No bowel wall thickening or dilation. Normal appendix. No  colonic diverticulosis.      Lymph nodes: No lymphadenopathy.      Mesentery/peritoneum: No free fluid, free air or fluid collection.      Vasculature: Mild abdominal aortic atherosclerotic calcifications  without aneurysmal dilation.      Pelvis: No free fluid, free air or fluid collection. There is a  unilocular simple appearing cystic structure centered in the  rectouterine space measuring approximately 7.1 x 6.9 x 5.3 cm.  Grossly unremarkable anteverted uterus.      Bones/Soft tissues: Retrolisthesis L1 over L2 by approximately 2 mm.  Right hip osteoarthritis. Degenerative changes in the left sacroiliac  joint.      IMPRESSION:  Nonspecific unilocular simple appearing cystic structure in the  rectouterine space (7 x 7 x 5 cm). Differential  considerations may  include peritoneal inclusion cyst or adnexal cystic lesion. Consider  further evaluation with female pelvis MRI.      No evidence of hiatal hernia, ventral hernia or colonic  diverticulosis.      Diagnosis  Encounter Diagnoses   Name Primary?    Chronic bilateral low back pain without sciatica     Myelopathy (Multi)     Right hip pain Yes          Assessment/Plan  Ms. Gold is a pleasant 64-year-old female who presents for initial evaluation of chronic low back and bilateral hip (R>L) pain.  On examination today in the office, patient did exhibit a positive FADIR test on the right which reproduced deep right sided groin pain which she says is a big part of her pain profile.  Her XR evaluation today in the office demonstrates a degenerative scoliosis that is advanced in comparison to her old x-rays from 2008.  She does have a subtle spondylolisthesis at L4-5 as well.  XR and CT evaluation also demonstrates evidence of subchondral sclerosis and cystic changes of her right hip joint, particularly at the ischiofemoral facet.  Separately, patient does report issues with balance and incoordination.  She denies much in the form of symptomology with her hands, although does exhibit cervical spondylosis on prior XRs available in the system.    I had an extensive discussion with the patient in the office today with regards to her symptoms, her imaging findings, and possible management options.  We did discuss how her lumbar degenerative changes could possibly contribute to stenosis that is manifesting as neurogenic claudication as a contributory etiology to her current presentation.  She does have some evidence of instability in her lumbar spine with her degenerative scoliosis in addition to her spondylolisthesis.  To that extent, we recommended proceeding with an MRI of her lumbar spine to evaluate for any focus of neurogenic stenosis that may be contributing to her symptoms.  With regards to her  balance/incoordination, we also felt it would be prudent to MRI image her cervical and thoracic spines as well to evaluate for any focus of spinal cord stenosis (I.e. myelopathy) that may be contributing to this.  She does have cervical spondylosis on her x-rays.    We placed orders for an MRI of her cervical, thoracic, and lumbar spines in the system today.  She will follow-up with our office in approximately 3 to 4 weeks after obtaining these MRIs to review the images and plan next steps.  I also placed a referral in the system for orthopedic joint evaluation of her right hip as I do believe a percentage of her symptoms are coming from right hip osteoarthritis.  I did offer physical therapy for the patient today in the office to address some of her symptoms, she declined saying that this may not work with her schedule currently.  She will reach out to my office if at any point she would consider physical therapy or [facility] evaluation. After our discussion, the patient articulated understanding of the plan and felt that all questions had been answered satisfactorily. The patient was pleased with the visit and very appreciative for the care rendered.    **Please excuse any errors in grammar or translation related to this dictation. Voice recognition software was utilized to prepare this document. **        Orders Placed This Encounter    MR cervical spine wo IV contrast    MR thoracic spine wo IV contrast    MR lumbar spine wo IV contrast    Referral to Orthopaedic Surgery       --    Bernard Berkowitz MD  Orthopaedic Spine Surgery  , Department of Orthopaedic Surgery  [facility]

## 2025-01-21 ENCOUNTER — APPOINTMENT (OUTPATIENT)
Dept: CARDIOLOGY | Facility: CLINIC | Age: 65
End: 2025-01-21
Payer: MEDICARE

## 2025-01-24 ENCOUNTER — ANESTHESIA EVENT (OUTPATIENT)
Dept: OPERATING ROOM | Facility: HOSPITAL | Age: 65
End: 2025-01-24
Payer: MEDICARE

## 2025-01-24 RX ORDER — ONDANSETRON HYDROCHLORIDE 2 MG/ML
4 INJECTION, SOLUTION INTRAVENOUS ONCE AS NEEDED
Status: CANCELLED | OUTPATIENT
Start: 2025-01-24

## 2025-01-24 RX ORDER — DROPERIDOL 2.5 MG/ML
0.62 INJECTION, SOLUTION INTRAMUSCULAR; INTRAVENOUS ONCE AS NEEDED
Status: CANCELLED | OUTPATIENT
Start: 2025-01-24

## 2025-01-27 ENCOUNTER — HOSPITAL ENCOUNTER (OUTPATIENT)
Dept: OPERATING ROOM | Facility: HOSPITAL | Age: 65
Setting detail: OUTPATIENT SURGERY
Discharge: HOME | End: 2025-01-27
Payer: MEDICARE

## 2025-01-27 ENCOUNTER — ANESTHESIA (OUTPATIENT)
Dept: OPERATING ROOM | Facility: HOSPITAL | Age: 65
End: 2025-01-27
Payer: MEDICARE

## 2025-01-27 VITALS
HEART RATE: 68 BPM | RESPIRATION RATE: 18 BRPM | TEMPERATURE: 96.8 F | HEIGHT: 66 IN | SYSTOLIC BLOOD PRESSURE: 119 MMHG | OXYGEN SATURATION: 100 % | WEIGHT: 163.14 LBS | DIASTOLIC BLOOD PRESSURE: 64 MMHG | BODY MASS INDEX: 26.22 KG/M2

## 2025-01-27 DIAGNOSIS — Z12.11 COLON CANCER SCREENING: ICD-10-CM

## 2025-01-27 PROCEDURE — 3700000001 HC GENERAL ANESTHESIA TIME - INITIAL BASE CHARGE: Performed by: ANESTHESIOLOGY

## 2025-01-27 PROCEDURE — A45385 PR COLONOSCOPY,REMV LESN,SNARE: Performed by: ANESTHESIOLOGY

## 2025-01-27 PROCEDURE — A45385 PR COLONOSCOPY,REMV LESN,SNARE: Performed by: NURSE ANESTHETIST, CERTIFIED REGISTERED

## 2025-01-27 PROCEDURE — 3600000007 HC OR TIME - EACH INCREMENTAL 1 MINUTE - PROCEDURE LEVEL TWO: Performed by: ANESTHESIOLOGY

## 2025-01-27 PROCEDURE — 45385 COLONOSCOPY W/LESION REMOVAL: CPT | Performed by: SURGERY

## 2025-01-27 PROCEDURE — 3600000002 HC OR TIME - INITIAL BASE CHARGE - PROCEDURE LEVEL TWO: Performed by: ANESTHESIOLOGY

## 2025-01-27 PROCEDURE — 3700000002 HC GENERAL ANESTHESIA TIME - EACH INCREMENTAL 1 MINUTE: Performed by: ANESTHESIOLOGY

## 2025-01-27 PROCEDURE — 7100000010 HC PHASE TWO TIME - EACH INCREMENTAL 1 MINUTE: Performed by: ANESTHESIOLOGY

## 2025-01-27 PROCEDURE — 7100000009 HC PHASE TWO TIME - INITIAL BASE CHARGE: Performed by: ANESTHESIOLOGY

## 2025-01-27 PROCEDURE — 2500000004 HC RX 250 GENERAL PHARMACY W/ HCPCS (ALT 636 FOR OP/ED): Performed by: ANESTHESIOLOGY

## 2025-01-27 PROCEDURE — 2500000004 HC RX 250 GENERAL PHARMACY W/ HCPCS (ALT 636 FOR OP/ED): Performed by: NURSE ANESTHETIST, CERTIFIED REGISTERED

## 2025-01-27 RX ORDER — LIDOCAINE HYDROCHLORIDE 10 MG/ML
INJECTION, SOLUTION EPIDURAL; INFILTRATION; INTRACAUDAL; PERINEURAL AS NEEDED
Status: DISCONTINUED | OUTPATIENT
Start: 2025-01-27 | End: 2025-01-27

## 2025-01-27 RX ORDER — PROPOFOL 10 MG/ML
INJECTION, EMULSION INTRAVENOUS AS NEEDED
Status: DISCONTINUED | OUTPATIENT
Start: 2025-01-27 | End: 2025-01-27

## 2025-01-27 RX ORDER — FENTANYL CITRATE 50 UG/ML
INJECTION, SOLUTION INTRAMUSCULAR; INTRAVENOUS AS NEEDED
Status: DISCONTINUED | OUTPATIENT
Start: 2025-01-27 | End: 2025-01-27

## 2025-01-27 RX ORDER — MIDAZOLAM HYDROCHLORIDE 1 MG/ML
INJECTION INTRAMUSCULAR; INTRAVENOUS AS NEEDED
Status: DISCONTINUED | OUTPATIENT
Start: 2025-01-27 | End: 2025-01-27

## 2025-01-27 RX ORDER — SODIUM CHLORIDE, SODIUM LACTATE, POTASSIUM CHLORIDE, CALCIUM CHLORIDE 600; 310; 30; 20 MG/100ML; MG/100ML; MG/100ML; MG/100ML
20 INJECTION, SOLUTION INTRAVENOUS CONTINUOUS
Status: DISCONTINUED | OUTPATIENT
Start: 2025-01-27 | End: 2025-01-28 | Stop reason: HOSPADM

## 2025-01-27 RX ADMIN — FENTANYL CITRATE 50 MCG: 50 INJECTION, SOLUTION INTRAMUSCULAR; INTRAVENOUS at 08:04

## 2025-01-27 RX ADMIN — PROPOFOL 50 MG: 10 INJECTION, EMULSION INTRAVENOUS at 07:57

## 2025-01-27 RX ADMIN — MIDAZOLAM HYDROCHLORIDE 1 MG: 1 INJECTION, SOLUTION INTRAMUSCULAR; INTRAVENOUS at 08:04

## 2025-01-27 RX ADMIN — SODIUM CHLORIDE, POTASSIUM CHLORIDE, SODIUM LACTATE AND CALCIUM CHLORIDE 20 ML/HR: 600; 310; 30; 20 INJECTION, SOLUTION INTRAVENOUS at 06:55

## 2025-01-27 RX ADMIN — LIDOCAINE HYDROCHLORIDE 50 MG: 10 SOLUTION INTRAVENOUS at 07:57

## 2025-01-27 RX ADMIN — MIDAZOLAM HYDROCHLORIDE 1 MG: 1 INJECTION, SOLUTION INTRAMUSCULAR; INTRAVENOUS at 07:57

## 2025-01-27 RX ADMIN — FENTANYL CITRATE 50 MCG: 50 INJECTION, SOLUTION INTRAMUSCULAR; INTRAVENOUS at 07:57

## 2025-01-27 RX ADMIN — PROPOFOL 120 MCG/KG/MIN: 10 INJECTION, EMULSION INTRAVENOUS at 08:03

## 2025-01-27 SDOH — HEALTH STABILITY: MENTAL HEALTH: CURRENT SMOKER: 0

## 2025-01-27 ASSESSMENT — PAIN SCALES - GENERAL
PAINLEVEL_OUTOF10: 0 - NO PAIN
PAINLEVEL_OUTOF10: 0 - NO PAIN

## 2025-01-27 ASSESSMENT — PAIN - FUNCTIONAL ASSESSMENT: PAIN_FUNCTIONAL_ASSESSMENT: 0-10

## 2025-01-27 ASSESSMENT — COLUMBIA-SUICIDE SEVERITY RATING SCALE - C-SSRS
1. IN THE PAST MONTH, HAVE YOU WISHED YOU WERE DEAD OR WISHED YOU COULD GO TO SLEEP AND NOT WAKE UP?: NO
6. HAVE YOU EVER DONE ANYTHING, STARTED TO DO ANYTHING, OR PREPARED TO DO ANYTHING TO END YOUR LIFE?: NO
2. HAVE YOU ACTUALLY HAD ANY THOUGHTS OF KILLING YOURSELF?: NO

## 2025-01-27 NOTE — ANESTHESIA POSTPROCEDURE EVALUATION
Patient: Radha Gold    Procedure Summary       Date: 01/27/25 Room / Location: [facility] OR    Anesthesia Start: 0750 Anesthesia Stop: 0826    Procedure: COLONOSCOPY Diagnosis: Colon cancer screening    Scheduled Providers: Randolph Anaya MD; Brian Chavez MD Responsible Provider: Brian Chavez MD    Anesthesia Type: MAC ASA Status: 3            Anesthesia Type: MAC    Vitals Value Taken Time   /67 01/27/25 0822   Temp 36 °C (96.8 °F) 01/27/25 0822   Pulse 67 01/27/25 0822   Resp 16 01/27/25 0822   SpO2 100 % 01/27/25 0822       Anesthesia Post Evaluation    Patient location during evaluation: PACU  Patient participation: complete - patient participated  Level of consciousness: awake  Pain management: adequate  Multimodal analgesia pain management approach  Airway patency: patent  Two or more strategies used to mitigate risk of obstructive sleep apnea  Cardiovascular status: acceptable  Respiratory status: acceptable  Hydration status: acceptable  Postoperative Nausea and Vomiting: none        No notable events documented.

## 2025-01-27 NOTE — H&P
General Surgery History and Physical    Referring Provider:  DO Nacho Matt Louis D, DO     Chief Complaint:  Colonoscopy    History of Present Illness:  This is a 64 y.o. female who presents for a colonoscopy.    Past Medical History:  Past Medical History:   Diagnosis Date    Anxiety     Colon cancer screening 01/27/2025    Daytime somnolence     Deviated nasal septum     GERD (gastroesophageal reflux disease)     Hiatal hernia     Hyperlipidemia     Hypertension     Left ovarian cyst     Lung nodules     DAMIÁN (obstructive sleep apnea)     Pain in female pelvis     Palpitations     Thyroid nodule         Past Surgical History:  Past Surgical History:   Procedure Laterality Date    SHOULDER SURGERY  01/17/2019        Medications:  Current Outpatient Medications   Medication Instructions    cholecalciferol (Vitamin D-3) 50 MCG (2000 UT) tablet 1 tablet, Every 24 hours    irbesartan (AVAPRO) 150 mg, oral, Daily    LORazepam (ATIVAN) 1 mg, Every 12 hours PRN    pantoprazole (PROTONIX) 40 mg, oral, 2 times daily, Do not crush, chew, or split. / 2 tablets daily    PARoxetine (Paxil) 20 mg tablet 1 tablet, Daily        Allergies:  Allergies   Allergen Reactions    Adhesive Tape-Silicones Rash        Family History:  Family History   Problem Relation Name Age of Onset    Hyperlipidemia Mother      Coronary artery disease Father      Hyperlipidemia Sister      Hyperlipidemia Brother          Social History:  Social History     Socioeconomic History    Marital status: Single   Tobacco Use    Smoking status: Never    Smokeless tobacco: Never   Vaping Use    Vaping status: Never Used   Substance and Sexual Activity    Alcohol use: Not Currently    Drug use: Never        Review of Systems:  A complete 12 point review of systems was performed and is negative except as noted in the history of present illness.      Vital Signs:  Vitals:    01/27/25 0628   BP: 118/63   Pulse: 87   Resp: 16   Temp: 36 °C (96.8 °F)   SpO2:  "100%          Physical Exam:  General: No acute distress. Sitting up in bed.   Neuro: Alert and oriented ×3. Follows commands.  Head: Atraumatic  Eyes: Pupils equal reactive to light. Extraocular motions intact.  Ears: Hears normal speaking voice.  Mouth, Nose, Throat: Mucous membranes moist.  Normal dentition.  Neck: Supple. No appreciable masses.  Chest: No crepitus.    Heart: Regular rate and rhythm.  Lung: Clear to auscultation bilaterally.  Vascular: No carotid bruits.  Palpable radial pulses bilaterally.  Abdomen: Soft. Nondistended. Nontender.    Musculoskeletal: Moves all extremities.  Normal range of motion.  Lymphatic: No palpable lymph nodes.  Skin: No rashes or lesions.  Psychological: Normal affect      Laboratory Values:  CBC: No results found for: \"WBC\", \"RBC\", \"HGB\", \"HCT\", \"PLT\"    RFP: No results found for: \"GLUF\", \"NA\", \"K\", \"CL\", \"CO2\", \"BUN\", \"CREATININE\", \"CALCIUM\", \"MG\", \"PHOS\"     LFTs: No results found for: \"PROT\", \"ALBUMIN\", \"BILITOT\", \"BILIDIR\", \"ALKPHOS\", \"AST\", \"ALT\"         Assessment:  This is a 64 y.o. female who presents for a colonoscopy.      Plan:  -- Colonoscopy.      Saman Anaya MD  General Surgery  Office: [phone]  Fax:     [phone]  7:51 AM   01/27/25     "

## 2025-01-27 NOTE — ANESTHESIA PREPROCEDURE EVALUATION
Patient: Radha Gold    Procedure Information       Date/Time: 01/27/25 0730    Scheduled providers: Randolph Anaya MD; Brian Chavez MD    Procedure: COLONOSCOPY    Location: [facility] OR          Vitals:    01/27/25 0628   BP: 118/63   Pulse: 87   Resp: 16   Temp: 36 °C (96.8 °F)   SpO2: 100%       Past Surgical History:   Procedure Laterality Date    SHOULDER SURGERY  01/17/2019     Past Medical History:   Diagnosis Date    Anxiety     Colon cancer screening 01/27/2025    Daytime somnolence     Deviated nasal septum     GERD (gastroesophageal reflux disease)     Hiatal hernia     Hyperlipidemia     Hypertension     Left ovarian cyst     Lung nodules     DAMIÁN (obstructive sleep apnea)     Pain in female pelvis     Palpitations     Thyroid nodule        Current Outpatient Medications:     cholecalciferol (Vitamin D-3) 50 MCG (2000 UT) tablet, Take 1 tablet (2,000 Units) by mouth once every 24 hours., Disp: , Rfl:     irbesartan (Avapro) 150 mg tablet, Take 1 tablet (150 mg) by mouth once daily., Disp: 100 tablet, Rfl: 2    LORazepam (Ativan) 1 mg tablet, Take 1 tablet (1 mg) by mouth every 12 hours if needed., Disp: , Rfl:     pantoprazole (ProtoNix) 40 mg EC tablet, Take 1 tablet (40 mg) by mouth 2 times a day. Do not crush, chew, or split. / 2 tablets daily, Disp: 180 tablet, Rfl: 1    PARoxetine (Paxil) 20 mg tablet, Take 1 tablet (20 mg) by mouth once daily., Disp: , Rfl:     Current Facility-Administered Medications:     lactated Ringer's infusion, 20 mL/hr, intravenous, Continuous, Brian Chavez MD, Last Rate: 20 mL/hr at 01/27/25 0655, 20 mL/hr at 01/27/25 0655  Prior to Admission medications    Medication Sig Start Date End Date Taking? Authorizing Provider   cholecalciferol (Vitamin D-3) 50 MCG (2000 UT) tablet Take 1 tablet (2,000 Units) by mouth once every 24 hours.   Yes Historical Provider, MD   irbesartan (Avapro) 150 mg tablet Take 1 tablet (150 mg) by mouth once  "daily. 8/1/24  Yes Adiel Griffith,    LORazepam (Ativan) 1 mg tablet Take 1 tablet (1 mg) by mouth every 12 hours if needed.   Yes Historical Provider, MD   pantoprazole (ProtoNix) 40 mg EC tablet Take 1 tablet (40 mg) by mouth 2 times a day. Do not crush, chew, or split. / 2 tablets daily 10/18/24 4/16/25 Yes Celia Hicks MD PhD   PARoxetine (Paxil) 20 mg tablet Take 1 tablet (20 mg) by mouth once daily. 2/4/19  Yes Historical Provider, MD   ergocalciferol (Vitamin D-2) 1.25 MG (30508 UT) capsule Take 1 capsule (1,250 mcg) by mouth 1 (one) time per week.  Patient not taking: Reported on 11/14/2024 2/5/19 1/27/25  Historical Provider, MD     Allergies   Allergen Reactions    Adhesive Tape-Silicones Rash     Social History     Tobacco Use    Smoking status: Never    Smokeless tobacco: Never   Substance Use Topics    Alcohol use: Not Currently         Chemistry    Lab Results   Component Value Date/Time     09/24/2024 0851    K 4.6 09/24/2024 0851     09/24/2024 0851    CO2 30 09/24/2024 0851    BUN 18 09/24/2024 0851    CREATININE 0.94 09/24/2024 0851    Lab Results   Component Value Date/Time    CALCIUM 9.5 09/24/2024 0851    ALKPHOS 87 09/24/2024 0851    AST 19 09/24/2024 0851    ALT 20 09/24/2024 0851    BILITOT 0.7 09/24/2024 0851          Lab Results   Component Value Date/Time    WBC 5.6 09/24/2024 0851    HGB 14.9 09/24/2024 0851    HCT 44.7 09/24/2024 0851     09/24/2024 0851     No results found for: \"PROTIME\", \"PTT\", \"INR\"  No results found for this or any previous visit (from the past 4464 hours).  No results found for this or any previous visit from the past 1095 days.      Relevant Problems   Cardiac   (+) Benign essential HTN   (+) Hyperlipidemia   (+) Hypertension      Pulmonary   (+) Lung nodules      Neuro   (+) Anxiety      GI   (+) Gastroesophageal reflux disease   (+) Hiatal hernia      Endocrine   (+) Goiter       Clinical information reviewed:   Tobacco  Allergies  Meds  " Problems  Med Hx  Surg Hx   Fam Hx          NPO Detail:  NPO/Void Status  Date of Last Liquid: 01/26/25  Date of Last Solid: 01/24/25         Physical Exam    Airway  Mallampati: II  TM distance: >3 FB     Cardiovascular - normal exam     Dental - normal exam     Pulmonary - normal exam     Abdominal   Abdomen: soft             Anesthesia Plan    History of general anesthesia?: yes  History of complications of general anesthesia?: no    ASA 3     MAC     The patient is not a current smoker.  Patient was not previously instructed to abstain from smoking on day of procedure.  Patient did not smoke on day of procedure.  Education provided regarding risk of obstructive sleep apnea.  intravenous induction   Anesthetic plan and risks discussed with patient.  Use of blood products discussed with who consented to blood products.    Plan discussed with CRNA.

## 2025-01-31 LAB
LABORATORY COMMENT REPORT: NORMAL
PATH REPORT.FINAL DX SPEC: NORMAL
PATH REPORT.GROSS SPEC: NORMAL
PATH REPORT.RELEVANT HX SPEC: NORMAL
PATH REPORT.TOTAL CANCER: NORMAL

## 2025-02-07 ENCOUNTER — APPOINTMENT (OUTPATIENT)
Dept: RADIOLOGY | Facility: CLINIC | Age: 65
End: 2025-02-07
Payer: MEDICARE

## 2025-02-11 ENCOUNTER — APPOINTMENT (OUTPATIENT)
Dept: OPERATING ROOM | Facility: HOSPITAL | Age: 65
End: 2025-02-11
Payer: MEDICARE

## 2025-02-19 ENCOUNTER — HOSPITAL ENCOUNTER (OUTPATIENT)
Dept: RADIOLOGY | Facility: CLINIC | Age: 65
Discharge: HOME | End: 2025-02-19
Payer: MEDICARE

## 2025-02-19 ENCOUNTER — APPOINTMENT (OUTPATIENT)
Dept: ORTHOPEDIC SURGERY | Facility: CLINIC | Age: 65
End: 2025-02-19
Payer: MEDICARE

## 2025-02-19 DIAGNOSIS — G95.9 MYELOPATHY (MULTI): ICD-10-CM

## 2025-02-19 PROCEDURE — 72148 MRI LUMBAR SPINE W/O DYE: CPT

## 2025-02-19 PROCEDURE — 72141 MRI NECK SPINE W/O DYE: CPT

## 2025-02-19 PROCEDURE — 72146 MRI CHEST SPINE W/O DYE: CPT

## 2025-02-28 ENCOUNTER — APPOINTMENT (OUTPATIENT)
Dept: ORTHOPEDIC SURGERY | Facility: CLINIC | Age: 65
End: 2025-02-28
Payer: MEDICARE

## 2025-03-05 ENCOUNTER — OFFICE VISIT (OUTPATIENT)
Dept: ORTHOPEDIC SURGERY | Facility: CLINIC | Age: 65
End: 2025-03-05
Payer: MEDICARE

## 2025-03-05 VITALS — BODY MASS INDEX: 26.2 KG/M2 | HEIGHT: 66 IN | WEIGHT: 163 LBS

## 2025-03-05 DIAGNOSIS — M48.061 LUMBAR STENOSIS WITHOUT NEUROGENIC CLAUDICATION: ICD-10-CM

## 2025-03-05 DIAGNOSIS — M54.50 CHRONIC BILATERAL LOW BACK PAIN WITHOUT SCIATICA: Primary | ICD-10-CM

## 2025-03-05 DIAGNOSIS — G89.29 CHRONIC BILATERAL LOW BACK PAIN WITHOUT SCIATICA: Primary | ICD-10-CM

## 2025-03-05 DIAGNOSIS — M25.551 RIGHT HIP PAIN: ICD-10-CM

## 2025-03-05 PROCEDURE — 99214 OFFICE O/P EST MOD 30 MIN: CPT | Performed by: STUDENT IN AN ORGANIZED HEALTH CARE EDUCATION/TRAINING PROGRAM

## 2025-03-05 PROCEDURE — 1159F MED LIST DOCD IN RCRD: CPT | Performed by: STUDENT IN AN ORGANIZED HEALTH CARE EDUCATION/TRAINING PROGRAM

## 2025-03-05 PROCEDURE — G2211 COMPLEX E/M VISIT ADD ON: HCPCS | Performed by: STUDENT IN AN ORGANIZED HEALTH CARE EDUCATION/TRAINING PROGRAM

## 2025-03-05 PROCEDURE — 3008F BODY MASS INDEX DOCD: CPT | Performed by: STUDENT IN AN ORGANIZED HEALTH CARE EDUCATION/TRAINING PROGRAM

## 2025-03-05 PROCEDURE — 1125F AMNT PAIN NOTED PAIN PRSNT: CPT | Performed by: STUDENT IN AN ORGANIZED HEALTH CARE EDUCATION/TRAINING PROGRAM

## 2025-03-05 ASSESSMENT — PAIN - FUNCTIONAL ASSESSMENT: PAIN_FUNCTIONAL_ASSESSMENT: 0-10

## 2025-03-05 ASSESSMENT — PAIN SCALES - GENERAL: PAINLEVEL_OUTOF10: 2

## 2025-03-05 ASSESSMENT — PAIN DESCRIPTION - DESCRIPTORS: DESCRIPTORS: ACHING

## 2025-03-05 NOTE — PROGRESS NOTES
Orthopaedic Spine Surgery Clinic Note    Patient ID: Radha Gold is a 65 y.o. female.    Chief Complaint  Low back and bilateral hip pain (R>L), balance/incoordination      History of Present Illness  Ms. Gold presents for follow-up evaluation of chronic low back and primarily right sided hip/buttock pain.  She continues to report that her symptoms are approximately stable since we last spoke to her in the office.  She reports pain in her right buttock that occasionally radiates into her right groin and down her right thigh into occasionally her right knee.  She does again relate a history of surgery to her right knee with prior meniscectomy.  Her pain is certainly activity related with prolonged ambulation, limiting her to approximately just 1 mile of walking.  She denies any numbness or paresthesias in her lower extremities.  With regards to her upper extremities, she has very intermittent upper extremity numbness and paresthesias in both hands.  However this is currently not bothersome to her.  She also reports a history of chronic neck pain that is also not significantly bothersome to her at the moment.  She presents for review of her MRI images.    1/14/2025:  Ms. Gold is a pleasant 64-year-old female who presents for initial evaluation of chronic low back and bilateral hip (R>L) pain along with balance/incoordination issues.  She denies any inciting event or injury.  She states that her issues have been going on for several months to years.  She notes a recent history of severe left-sided pelvic pain that she had previously evaluated by an MRI pelvis.  She was found to have some cysts in her ovaries and subsequently had surgery to remove these.  The nature of her pain currently is very different from her prior sharp left-sided pelvic pain.  She currently reports pain in a bandlike distribution across her lower abdomen and pelvis.  At times this radiates into her buttocks and is associated with a  heaviness.  She specifically notes pain in her right groin as well.  She reports worsening with activity including prolonged ambulation and deep forward bending.  She does occasionally have pain radiating from her right groin down to her right knee with some pain on the anterior side of her right kneecap.    She denies pain otherwise radiating into her lower extremities, apart from frequent muscle spasms of her lower extremities particularly at night.  Denies numbness or paresthesias.  Denies bowel or bladder control issues.  She does also specifically note a history of imbalance and incoordination with her lower extremities.  She states that she believes she has some symptoms of vertigo.  She specifically notes a history of vertiginous symptoms that sometimes occurs when she is even seated.  She denies any upper extremity numbness or paresthesias, dexterity issues.  She does endorse some occasional weakness with her upper extremities which she believes secondary to osteoarthritis.    Prior treatments:  She has not had formal physical therapy recently, although she states she has had this in the remote past.  She has been very diligent doing home stretching at home.  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  She has been very diligent about deep tissue massage which she has found helpful    She does not take any medications for her discomfort.  She is unable to tolerate NSAIDs due to a history of hiatal hernia    Relevant PMH/PSH for spine    Past Medical History:   Diagnosis Date    Anxiety     Colon cancer screening 01/27/2025    Daytime somnolence     Deviated nasal septum     GERD (gastroesophageal reflux disease)     Hiatal hernia     Hyperlipidemia     Hypertension     Left ovarian cyst     Lung nodules     DAMIÁN (obstructive sleep apnea)     Pain in female pelvis      Palpitations     Thyroid nodule        Past Surgical History:   Procedure Laterality Date    SHOULDER SURGERY  01/17/2019       Social History     Socioeconomic History    Marital status: Single   Tobacco Use    Smoking status: Never    Smokeless tobacco: Never   Vaping Use    Vaping status: Never Used   Substance and Sexual Activity    Alcohol use: Not Currently    Drug use: Never       Family History   Problem Relation Name Age of Onset    Hyperlipidemia Mother      Coronary artery disease Father      Hyperlipidemia Sister      Hyperlipidemia Brother         Allergies   Allergen Reactions    Adhesive Tape-Silicones Rash       Current Outpatient Medications   Medication Instructions    cholecalciferol (Vitamin D-3) 50 MCG (2000 UT) tablet 1 tablet, Every 24 hours    irbesartan (AVAPRO) 150 mg, oral, Daily    LORazepam (ATIVAN) 1 mg, Every 12 hours PRN    pantoprazole (PROTONIX) 40 mg, oral, 2 times daily, Do not crush, chew, or split. / 2 tablets daily    PARoxetine (Paxil) 20 mg tablet 1 tablet, Daily         Vitals & Measurements  There were no vitals filed for this visit.     Ortho Exam  General: AO x 3, NAD  Cardio: examined extremities perfused by peripheral palpation  Resp: breathing unlabored  Gait: within normal limits, non-antalgic  Tenderness: Positive tenderness to palpation over the left PSIS    Upper Extremity  Right  Left  Deltoid   5/5  5/5  Biceps   5/5  5/5  Triceps   5/5  5/5  Wrist extension  5/5  5/5     4/5  4/5  Intrinsics  5/5  5/5      Lower Extremity  Right  Left  IP   4*/5  4*/5  Quadriceps  5/5  5/5  Tibialis anterior  5/5  5/5  EHL   5/5  5/5  Gastrocnemius  5/5  5/5  *Pain limited    Sensation: Normal to light touch throughout upper and lower extremities bilaterally.      Diagnostic Results - Imaging    XR lumbar spine, 1/14/25  FINDINGS:  Moderate multilevel lumbar degenerative changes diffusely with a mild  scoliosis.      Disc disease greatest L5-S1. Minimal retrolisthesis L1-2  and minimal  anterolisthesis L3-4.      IMPRESSION:  Moderate lumbar degenerative changes within mild scoliosis.          XR cervical spine, 12/13/21  FINDINGS:  There is no evidence of acute fracture identified. The vertebral  bodies are well aligned without evidence of subluxation. No  prevertebral soft tissue swelling is present.  Moderate disc space  narrowing and small marginal osteophytes are present at the C5-6 and  C6-7 levels. Moderate facet degenerative changes are seen throughout  the cervical spine. Osseous neural foraminal narrowing is seen  throughout the cervical spine bilaterally.     IMPRESSION:  1.  No evidence of acute fracture.  2.  Degenerative changes, as above.        XR hip right, 10/4/2023  FINDINGS:  No acute fracture is identified. No dislocation is seen. There are no  lytic or blastic lesions. No radiopaque foreign bodies are noted.  Sacroiliac joints appear symmetric. There is no diastasis of the  pubic symphysis. There are degenerative changes involving both hips  including joint space narrowing, spurring, and subchondral sclerosis.      IMPRESSION:  No radiographic evidence of an acute fracture.      CT abdomen/pelvis, 9/25/2024  FINDINGS:  Lower chest: No focal consolidation or pleural effusion.      Liver: No mass.      Biliary: No intrahepatic or extrahepatic bile duct dilation. No  cholelithiasis.      Spleen: No mass. No splenomegaly.      Pancreas: No mass or duct dilation.      Adrenals: Normal.      Kidneys: No suspicious mass, calculus or hydronephrosis.      GI tract: No bowel wall thickening or dilation. Normal appendix. No  colonic diverticulosis.      Lymph nodes: No lymphadenopathy.      Mesentery/peritoneum: No free fluid, free air or fluid collection.      Vasculature: Mild abdominal aortic atherosclerotic calcifications  without aneurysmal dilation.      Pelvis: No free fluid, free air or fluid collection. There is a  unilocular simple appearing cystic structure  centered in the  rectouterine space measuring approximately 7.1 x 6.9 x 5.3 cm.  Grossly unremarkable anteverted uterus.      Bones/Soft tissues: Retrolisthesis L1 over L2 by approximately 2 mm.  Right hip osteoarthritis. Degenerative changes in the left sacroiliac  joint.      IMPRESSION:  Nonspecific unilocular simple appearing cystic structure in the  rectouterine space (7 x 7 x 5 cm). Differential considerations may  include peritoneal inclusion cyst or adnexal cystic lesion. Consider  further evaluation with female pelvis MRI.      No evidence of hiatal hernia, ventral hernia or colonic  diverticulosis.        MRI cervical spine, 2/19/2025  FINDINGS:  Cord: Normal in caliber and signal.      Epidural fluid: None.      Alignment: Levo scoliosis of the cervicothoracic region.  Trace/equivocal anterolisthesis of C4 on C5 and C7 on T1.      Vertebral bodies: Mild degenerative endplate height loss at C5-C7.  Cervical vertebral body heights are otherwise maintained.      Marrow signal: Type 1 Modic endplate signal change at C5-C6 with  likely trace involvement at C6-C7. Suspected trace degenerative right  C7-T1 perifacet edema as well. Otherwise mild nonspecific marrow  heterogeneity with no suspicious STIR hyperintensity/marrow edema.      Intervertebral Discs: Moderate degenerative disc height loss at C5-C6  and C6-C7.          Degenerative change:      C1-C2:  Unremarkable.      C2-C3:  Mild right facet arthropathy. No spinal canal stenosis or  substantial neural foraminal narrowing.      C3-C4:  Mild right-greater-than-left facet arthropathy. Mild left  uncovertebral spurring with minimal disc bulge. No spinal canal  stenosis. Moderate left and minimal right neural foraminal narrowing.      C4-C5:  Mild bilateral facet arthropathy. Mild disc bulge with trace  uncovertebral spurring. No spinal canal stenosis. No left and  moderate right neural foraminal narrowing.      C5-C6:  Mild right-greater-than-left facet  arthropathy. Trace  ligamentum flavum thickening. Disc bulge with bilateral uncovertebral  spurring. There is mild spinal canal stenosis. There is  mild-to-moderate right and mild left neural foraminal narrowing.      C6-C7:  Mild facet arthropathy with trace ligamentum flavum  thickening. Disc bulge and uncovertebral/endplate spurring. There is  mild spinal canal stenosis. Minimal left and moderate right neural  foraminal narrowing.      C7-T1:  Moderate right and mild left facet arthropathy. Trace disc  uncovering. No spinal canal stenosis. Mild-to-moderate right and no  significant left neural foraminal narrowing.      Soft tissues: The prevertebral and posterior paraspinous soft tissues  are within normal limits. Incompletely visualized nodule suggested  within the right lobe of the thyroid gland (series 26, image 29).      IMPRESSION:  Cervical spondylosis, particularly within the C5-C7 regions with mild  spinal canal stenosis at these levels. Moderate multilevel neural  foraminal narrowing secondary to degenerative discogenic change and  uncovertebral as well as facet arthropathy.      Partially visualized right thyroid gland nodule, characterized on  prior ultrasound.          MRI thoracic spine, 2/19/2025  FINDINGS:  Spinal cord: Normal in caliber and signal characteristics.      Extra-axial fluid: None.      Alignment: Element of scoliosis. Thoracic alignment is otherwise  maintained.      Vertebral body heights: Thoracic vertebral body heights are preserved.      Marrow signal: Element of mild nonspecific marrow heterogeneity.  Trace type 1 Modic endplate signal change at T4-T5 and possibly at  T10-T11. No suspicious STIR hyperintensity/marrow edema within the  thoracic spine. T6 vertebral body hemangioma. Additional trace type 2  Modic endplate signal change such as anteriorly at T8-T9.      Intervertebral discs: There is mild, relatively diffuse degenerative  disc height loss with additional moderate  degenerative disc height  loss at T4-T5 and T5-T6, for example. There are scattered small  noncompressive disc protrusions and minimal disc bulges throughout  the thoracic region.      Degenerative change: No thoracic spinal canal stenosis. No neural  foraminal narrowing.      Paraspinous Soft Tissues: Within normal limits.      IMPRESSION:  Thoracic scoliosis and mild spondylosis. No significant thoracic  spinal canal stenosis or neural foraminal narrowing.          MRI lumbar spine, 2/19/2025  FINDINGS:  Segmentation: Normal.      Conus: The lower thoracic cord appears unremarkable. The conus  terminates at the T12-L1 interspace level. Cauda equina are  unremarkable.      Epidural fluid: None.      Alignment: Levo scoliosis of the lumbar spine. Estimated 2 mm  retrolisthesis of L1 on L2 and 2 mm anterolisthesis of L3 on L4.      Vertebral bodies: Lumbar vertebral body heights are maintained.      Marrow signal: Element of nonspecific marrow heterogeneity. Trace  type 1 Modic endplate signal change at L2-L3 and along the leftward  aspect of L5-S1. Atypical L2 vertebral body hemangioma suspected.  Otherwise no suspicious STIR hyperintensity/marrow edema within the  lumbar spine.      Intervertebral discs: Mild degenerative disc height loss at L1-L2 and  L3-L4. Diffuse lumbar disc desiccation.          Degenerative change:      T12-L1: Unremarkable.      L1-2: Mild ligamentum flavum thickening. Disc bulge/uncovering. There  is lateral recess narrowing with no additional spinal canal stenosis.  Minimal bilateral neural foraminal narrowing.      L2-3: Trace facet spurring and ligamentum flavum thickening. Mild  disc bulge and endplate spurring, worse along the right lateral  aspect. Slight encroachment on the lateral recesses without spinal  canal stenosis. Mild bilateral neural foraminal narrowing.      L3-4: Minimal/mild facet arthropathy with ligamentum flavum  hypertrophy. Mild disc bulge with superimposed small  right  subarticular/foraminal disc extrusion component with estimated 5 mm  cranial migration beyond the inferior endplate of L3 (series 8, image  12). There is right lateral recess stenosis with mass effect on the  descending right L4 nerve root. There is no additional spinal canal  stenosis. There is mild right and no significant left neural  foraminal narrowing.      L4-5: Mild left-greater-than-right facet arthropathy and ligamentum  flavum hypertrophy. Mild disc bulge and disc uncovering. Tiny central  disc extrusion with caudal migration and fissuring.  Left-greater-than-right lateral recess stenosis with minimal spinal  canal stenosis. No right and mild left neural foraminal narrowing.      L5-S1: Mild facet arthropathy. Disc bulge and hypertrophic endplate  spurring, predominantly along the left foraminal and extraforaminal  zones. There is contact of the descending left S1 nerve root with no  additional spinal canal stenosis. No substantial right and moderate  left neural foraminal narrowing.      Soft tissues: Mild fatty infiltration of the inferior posterior  paraspinal musculature.      IMPRESSION:  Rotatory levo scoliosis of the lumbar spine as well as mild  degenerative spondylolisthesis.      There is a right subarticular/foraminal disc extrusion component at  L3-L4 with right lateral recess stenosis and compression of the  descending right L4 nerve root. Mild right L3 neural foraminal  narrowing with no additional spinal canal stenosis.      Moderate left L5 neural foraminal narrowing secondary to disc  osteophyte components with contact of the descending left S1 nerve  root within the left lateral recess.        Diagnosis  Encounter Diagnoses   Name Primary?    Lumbar stenosis without neurogenic claudication     Right hip pain     Chronic bilateral low back pain without sciatica Yes            Assessment/Plan  Ms. Gold is a pleasant 64-year-old female who presents for follow-up evaluation of  chronic low back and primarily right-sided hip pain.  She relates pain on the right side of her low back that proceeds into her right buttock and then radiates anteriorly into her right groin and then occasionally proceeds down her right thigh to her right knee.  She does have locoregional right knee pain owed to prior surgeries she's had. On examination, patient did exhibit a positive FADIR test on the right which reproduced deep right sided groin pain which she says is a big part of her pain profile.  Her XR evaluation demonstrates a degenerative scoliosis that is advanced in comparison to her old x-rays from 2008.  She does have a subtle spondylolisthesis at L4-5 as well.  XR and CT evaluation also demonstrates evidence of subchondral sclerosis and cystic changes of her right hip joint, particularly at the ischiofemoral facet.      We did review her MRI imaging of the cervical, thoracic, and lumbar spines.  This demonstrates cervical spondylosis, particularly from C5-7 with mild spinal canal stenosis and moderate multilevel neuroforaminal stenosis.  Symptomatically, she does not exhibit significant, persistent radiculopathy.  She has no myelopathy signs or symptoms.  She demonstrates no significant stenosis in the thoracic spine.  In the lumbar spine, she does have a right subarticular/foraminal disc extrusion narrowing and possibly compressing the traversing right L4 nerve root.  This certainly coincides with her radicular distribution as well.  However, we did discuss that she may have some component of locoregional pain in her right hip as well as her right knee.    I had an extensive discussion with the patient in the office today with regards to her symptoms, her imaging findings, and possible management options.  We discussed that she may have some overlap of locoregional right hip and knee pain in addition to a right L4 radiculopathy.  She does have a narrowing of the right L3-4 lateral recess contributing  to compression of the right L3 traversing nerve root.  We discussed investigating this further with a referral to pain management for consideration of a diagnostic and therapeutic injection.  This could entail an interlaminar L3-4 injection.  I placed a referral to my colleague, Dr. Breaux.  Patient also has an appointment scheduled with Dr. Carnes to evaluate her right hip and her right knee.  I encouraged her to move forward with this.    Patient will follow-up with me in approximately 6 to 8 weeks after completing her lumbar FREDA injection in addition to her evaluation with Dr. Carnes.  If her injection is found to be helpful from a diagnostic standpoint, but her symptoms recur, we can consider surgical discussion for a right L3-4 decompression.  This is also pending an evaluation of her right hip and knee by Dr. Carnes.  After our discussion, the patient articulated understanding of the plan and felt that all questions had been answered satisfactorily. The patient was pleased with the visit and very appreciative for the care rendered.    **Please excuse any errors in grammar or translation related to this dictation. Voice recognition software was utilized to prepare this document. **    F/u 6-8 weeks.       Orders Placed This Encounter    Referral to Pain Medicine       --    Bernard Berkowitz MD  Orthopaedic Spine Surgery  , Department of Orthopaedic Surgery  [facility]

## 2025-03-05 NOTE — H&P (VIEW-ONLY)
Orthopaedic Spine Surgery Clinic Note    Patient ID: Radha Gold is a 65 y.o. female.    Chief Complaint  Low back and bilateral hip pain (R>L), balance/incoordination      History of Present Illness  Ms. Gold presents for follow-up evaluation of chronic low back and primarily right sided hip/buttock pain.  She continues to report that her symptoms are approximately stable since we last spoke to her in the office.  She reports pain in her right buttock that occasionally radiates into her right groin and down her right thigh into occasionally her right knee.  She does again relate a history of surgery to her right knee with prior meniscectomy.  Her pain is certainly activity related with prolonged ambulation, limiting her to approximately just 1 mile of walking.  She denies any numbness or paresthesias in her lower extremities.  With regards to her upper extremities, she has very intermittent upper extremity numbness and paresthesias in both hands.  However this is currently not bothersome to her.  She also reports a history of chronic neck pain that is also not significantly bothersome to her at the moment.  She presents for review of her MRI images.    1/14/2025:  Ms. Gold is a pleasant 64-year-old female who presents for initial evaluation of chronic low back and bilateral hip (R>L) pain along with balance/incoordination issues.  She denies any inciting event or injury.  She states that her issues have been going on for several months to years.  She notes a recent history of severe left-sided pelvic pain that she had previously evaluated by an MRI pelvis.  She was found to have some cysts in her ovaries and subsequently had surgery to remove these.  The nature of her pain currently is very different from her prior sharp left-sided pelvic pain.  She currently reports pain in a bandlike distribution across her lower abdomen and pelvis.  At times this radiates into her buttocks and is associated with a  heaviness.  She specifically notes pain in her right groin as well.  She reports worsening with activity including prolonged ambulation and deep forward bending.  She does occasionally have pain radiating from her right groin down to her right knee with some pain on the anterior side of her right kneecap.    She denies pain otherwise radiating into her lower extremities, apart from frequent muscle spasms of her lower extremities particularly at night.  Denies numbness or paresthesias.  Denies bowel or bladder control issues.  She does also specifically note a history of imbalance and incoordination with her lower extremities.  She states that she believes she has some symptoms of vertigo.  She specifically notes a history of vertiginous symptoms that sometimes occurs when she is even seated.  She denies any upper extremity numbness or paresthesias, dexterity issues.  She does endorse some occasional weakness with her upper extremities which she believes secondary to osteoarthritis.    Prior treatments:  She has not had formal physical therapy recently, although she states she has had this in the remote past.  She has been very diligent doing home stretching at home.  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  She has been very diligent about deep tissue massage which she has found helpful    She does not take any medications for her discomfort.  She is unable to tolerate NSAIDs due to a history of hiatal hernia    Relevant PMH/PSH for spine    Past Medical History:   Diagnosis Date    Anxiety     Colon cancer screening 01/27/2025    Daytime somnolence     Deviated nasal septum     GERD (gastroesophageal reflux disease)     Hiatal hernia     Hyperlipidemia     Hypertension     Left ovarian cyst     Lung nodules     DAMIÁN (obstructive sleep apnea)     Pain in female pelvis      Palpitations     Thyroid nodule        Past Surgical History:   Procedure Laterality Date    SHOULDER SURGERY  01/17/2019       Social History     Socioeconomic History    Marital status: Single   Tobacco Use    Smoking status: Never    Smokeless tobacco: Never   Vaping Use    Vaping status: Never Used   Substance and Sexual Activity    Alcohol use: Not Currently    Drug use: Never       Family History   Problem Relation Name Age of Onset    Hyperlipidemia Mother      Coronary artery disease Father      Hyperlipidemia Sister      Hyperlipidemia Brother         Allergies   Allergen Reactions    Adhesive Tape-Silicones Rash       Current Outpatient Medications   Medication Instructions    cholecalciferol (Vitamin D-3) 50 MCG (2000 UT) tablet 1 tablet, Every 24 hours    irbesartan (AVAPRO) 150 mg, oral, Daily    LORazepam (ATIVAN) 1 mg, Every 12 hours PRN    pantoprazole (PROTONIX) 40 mg, oral, 2 times daily, Do not crush, chew, or split. / 2 tablets daily    PARoxetine (Paxil) 20 mg tablet 1 tablet, Daily         Vitals & Measurements  There were no vitals filed for this visit.     Ortho Exam  General: AO x 3, NAD  Cardio: examined extremities perfused by peripheral palpation  Resp: breathing unlabored  Gait: within normal limits, non-antalgic  Tenderness: Positive tenderness to palpation over the left PSIS    Upper Extremity  Right  Left  Deltoid   5/5  5/5  Biceps   5/5  5/5  Triceps   5/5  5/5  Wrist extension  5/5  5/5     4/5  4/5  Intrinsics  5/5  5/5      Lower Extremity  Right  Left  IP   4*/5  4*/5  Quadriceps  5/5  5/5  Tibialis anterior  5/5  5/5  EHL   5/5  5/5  Gastrocnemius  5/5  5/5  *Pain limited    Sensation: Normal to light touch throughout upper and lower extremities bilaterally.      Diagnostic Results - Imaging    XR lumbar spine, 1/14/25  FINDINGS:  Moderate multilevel lumbar degenerative changes diffusely with a mild  scoliosis.      Disc disease greatest L5-S1. Minimal retrolisthesis L1-2  and minimal  anterolisthesis L3-4.      IMPRESSION:  Moderate lumbar degenerative changes within mild scoliosis.          XR cervical spine, 12/13/21  FINDINGS:  There is no evidence of acute fracture identified. The vertebral  bodies are well aligned without evidence of subluxation. No  prevertebral soft tissue swelling is present.  Moderate disc space  narrowing and small marginal osteophytes are present at the C5-6 and  C6-7 levels. Moderate facet degenerative changes are seen throughout  the cervical spine. Osseous neural foraminal narrowing is seen  throughout the cervical spine bilaterally.     IMPRESSION:  1.  No evidence of acute fracture.  2.  Degenerative changes, as above.        XR hip right, 10/4/2023  FINDINGS:  No acute fracture is identified. No dislocation is seen. There are no  lytic or blastic lesions. No radiopaque foreign bodies are noted.  Sacroiliac joints appear symmetric. There is no diastasis of the  pubic symphysis. There are degenerative changes involving both hips  including joint space narrowing, spurring, and subchondral sclerosis.      IMPRESSION:  No radiographic evidence of an acute fracture.      CT abdomen/pelvis, 9/25/2024  FINDINGS:  Lower chest: No focal consolidation or pleural effusion.      Liver: No mass.      Biliary: No intrahepatic or extrahepatic bile duct dilation. No  cholelithiasis.      Spleen: No mass. No splenomegaly.      Pancreas: No mass or duct dilation.      Adrenals: Normal.      Kidneys: No suspicious mass, calculus or hydronephrosis.      GI tract: No bowel wall thickening or dilation. Normal appendix. No  colonic diverticulosis.      Lymph nodes: No lymphadenopathy.      Mesentery/peritoneum: No free fluid, free air or fluid collection.      Vasculature: Mild abdominal aortic atherosclerotic calcifications  without aneurysmal dilation.      Pelvis: No free fluid, free air or fluid collection. There is a  unilocular simple appearing cystic structure  centered in the  rectouterine space measuring approximately 7.1 x 6.9 x 5.3 cm.  Grossly unremarkable anteverted uterus.      Bones/Soft tissues: Retrolisthesis L1 over L2 by approximately 2 mm.  Right hip osteoarthritis. Degenerative changes in the left sacroiliac  joint.      IMPRESSION:  Nonspecific unilocular simple appearing cystic structure in the  rectouterine space (7 x 7 x 5 cm). Differential considerations may  include peritoneal inclusion cyst or adnexal cystic lesion. Consider  further evaluation with female pelvis MRI.      No evidence of hiatal hernia, ventral hernia or colonic  diverticulosis.        MRI cervical spine, 2/19/2025  FINDINGS:  Cord: Normal in caliber and signal.      Epidural fluid: None.      Alignment: Levo scoliosis of the cervicothoracic region.  Trace/equivocal anterolisthesis of C4 on C5 and C7 on T1.      Vertebral bodies: Mild degenerative endplate height loss at C5-C7.  Cervical vertebral body heights are otherwise maintained.      Marrow signal: Type 1 Modic endplate signal change at C5-C6 with  likely trace involvement at C6-C7. Suspected trace degenerative right  C7-T1 perifacet edema as well. Otherwise mild nonspecific marrow  heterogeneity with no suspicious STIR hyperintensity/marrow edema.      Intervertebral Discs: Moderate degenerative disc height loss at C5-C6  and C6-C7.          Degenerative change:      C1-C2:  Unremarkable.      C2-C3:  Mild right facet arthropathy. No spinal canal stenosis or  substantial neural foraminal narrowing.      C3-C4:  Mild right-greater-than-left facet arthropathy. Mild left  uncovertebral spurring with minimal disc bulge. No spinal canal  stenosis. Moderate left and minimal right neural foraminal narrowing.      C4-C5:  Mild bilateral facet arthropathy. Mild disc bulge with trace  uncovertebral spurring. No spinal canal stenosis. No left and  moderate right neural foraminal narrowing.      C5-C6:  Mild right-greater-than-left facet  arthropathy. Trace  ligamentum flavum thickening. Disc bulge with bilateral uncovertebral  spurring. There is mild spinal canal stenosis. There is  mild-to-moderate right and mild left neural foraminal narrowing.      C6-C7:  Mild facet arthropathy with trace ligamentum flavum  thickening. Disc bulge and uncovertebral/endplate spurring. There is  mild spinal canal stenosis. Minimal left and moderate right neural  foraminal narrowing.      C7-T1:  Moderate right and mild left facet arthropathy. Trace disc  uncovering. No spinal canal stenosis. Mild-to-moderate right and no  significant left neural foraminal narrowing.      Soft tissues: The prevertebral and posterior paraspinous soft tissues  are within normal limits. Incompletely visualized nodule suggested  within the right lobe of the thyroid gland (series 26, image 29).      IMPRESSION:  Cervical spondylosis, particularly within the C5-C7 regions with mild  spinal canal stenosis at these levels. Moderate multilevel neural  foraminal narrowing secondary to degenerative discogenic change and  uncovertebral as well as facet arthropathy.      Partially visualized right thyroid gland nodule, characterized on  prior ultrasound.          MRI thoracic spine, 2/19/2025  FINDINGS:  Spinal cord: Normal in caliber and signal characteristics.      Extra-axial fluid: None.      Alignment: Element of scoliosis. Thoracic alignment is otherwise  maintained.      Vertebral body heights: Thoracic vertebral body heights are preserved.      Marrow signal: Element of mild nonspecific marrow heterogeneity.  Trace type 1 Modic endplate signal change at T4-T5 and possibly at  T10-T11. No suspicious STIR hyperintensity/marrow edema within the  thoracic spine. T6 vertebral body hemangioma. Additional trace type 2  Modic endplate signal change such as anteriorly at T8-T9.      Intervertebral discs: There is mild, relatively diffuse degenerative  disc height loss with additional moderate  degenerative disc height  loss at T4-T5 and T5-T6, for example. There are scattered small  noncompressive disc protrusions and minimal disc bulges throughout  the thoracic region.      Degenerative change: No thoracic spinal canal stenosis. No neural  foraminal narrowing.      Paraspinous Soft Tissues: Within normal limits.      IMPRESSION:  Thoracic scoliosis and mild spondylosis. No significant thoracic  spinal canal stenosis or neural foraminal narrowing.          MRI lumbar spine, 2/19/2025  FINDINGS:  Segmentation: Normal.      Conus: The lower thoracic cord appears unremarkable. The conus  terminates at the T12-L1 interspace level. Cauda equina are  unremarkable.      Epidural fluid: None.      Alignment: Levo scoliosis of the lumbar spine. Estimated 2 mm  retrolisthesis of L1 on L2 and 2 mm anterolisthesis of L3 on L4.      Vertebral bodies: Lumbar vertebral body heights are maintained.      Marrow signal: Element of nonspecific marrow heterogeneity. Trace  type 1 Modic endplate signal change at L2-L3 and along the leftward  aspect of L5-S1. Atypical L2 vertebral body hemangioma suspected.  Otherwise no suspicious STIR hyperintensity/marrow edema within the  lumbar spine.      Intervertebral discs: Mild degenerative disc height loss at L1-L2 and  L3-L4. Diffuse lumbar disc desiccation.          Degenerative change:      T12-L1: Unremarkable.      L1-2: Mild ligamentum flavum thickening. Disc bulge/uncovering. There  is lateral recess narrowing with no additional spinal canal stenosis.  Minimal bilateral neural foraminal narrowing.      L2-3: Trace facet spurring and ligamentum flavum thickening. Mild  disc bulge and endplate spurring, worse along the right lateral  aspect. Slight encroachment on the lateral recesses without spinal  canal stenosis. Mild bilateral neural foraminal narrowing.      L3-4: Minimal/mild facet arthropathy with ligamentum flavum  hypertrophy. Mild disc bulge with superimposed small  right  subarticular/foraminal disc extrusion component with estimated 5 mm  cranial migration beyond the inferior endplate of L3 (series 8, image  12). There is right lateral recess stenosis with mass effect on the  descending right L4 nerve root. There is no additional spinal canal  stenosis. There is mild right and no significant left neural  foraminal narrowing.      L4-5: Mild left-greater-than-right facet arthropathy and ligamentum  flavum hypertrophy. Mild disc bulge and disc uncovering. Tiny central  disc extrusion with caudal migration and fissuring.  Left-greater-than-right lateral recess stenosis with minimal spinal  canal stenosis. No right and mild left neural foraminal narrowing.      L5-S1: Mild facet arthropathy. Disc bulge and hypertrophic endplate  spurring, predominantly along the left foraminal and extraforaminal  zones. There is contact of the descending left S1 nerve root with no  additional spinal canal stenosis. No substantial right and moderate  left neural foraminal narrowing.      Soft tissues: Mild fatty infiltration of the inferior posterior  paraspinal musculature.      IMPRESSION:  Rotatory levo scoliosis of the lumbar spine as well as mild  degenerative spondylolisthesis.      There is a right subarticular/foraminal disc extrusion component at  L3-L4 with right lateral recess stenosis and compression of the  descending right L4 nerve root. Mild right L3 neural foraminal  narrowing with no additional spinal canal stenosis.      Moderate left L5 neural foraminal narrowing secondary to disc  osteophyte components with contact of the descending left S1 nerve  root within the left lateral recess.        Diagnosis  Encounter Diagnoses   Name Primary?    Lumbar stenosis without neurogenic claudication     Right hip pain     Chronic bilateral low back pain without sciatica Yes            Assessment/Plan  Ms. Gold is a pleasant 64-year-old female who presents for follow-up evaluation of  chronic low back and primarily right-sided hip pain.  She relates pain on the right side of her low back that proceeds into her right buttock and then radiates anteriorly into her right groin and then occasionally proceeds down her right thigh to her right knee.  She does have locoregional right knee pain owed to prior surgeries she's had. On examination, patient did exhibit a positive FADIR test on the right which reproduced deep right sided groin pain which she says is a big part of her pain profile.  Her XR evaluation demonstrates a degenerative scoliosis that is advanced in comparison to her old x-rays from 2008.  She does have a subtle spondylolisthesis at L4-5 as well.  XR and CT evaluation also demonstrates evidence of subchondral sclerosis and cystic changes of her right hip joint, particularly at the ischiofemoral facet.      We did review her MRI imaging of the cervical, thoracic, and lumbar spines.  This demonstrates cervical spondylosis, particularly from C5-7 with mild spinal canal stenosis and moderate multilevel neuroforaminal stenosis.  Symptomatically, she does not exhibit significant, persistent radiculopathy.  She has no myelopathy signs or symptoms.  She demonstrates no significant stenosis in the thoracic spine.  In the lumbar spine, she does have a right subarticular/foraminal disc extrusion narrowing and possibly compressing the traversing right L4 nerve root.  This certainly coincides with her radicular distribution as well.  However, we did discuss that she may have some component of locoregional pain in her right hip as well as her right knee.    I had an extensive discussion with the patient in the office today with regards to her symptoms, her imaging findings, and possible management options.  We discussed that she may have some overlap of locoregional right hip and knee pain in addition to a right L4 radiculopathy.  She does have a narrowing of the right L3-4 lateral recess contributing  to compression of the right L3 traversing nerve root.  We discussed investigating this further with a referral to pain management for consideration of a diagnostic and therapeutic injection.  This could entail an interlaminar L3-4 injection.  I placed a referral to my colleague, Dr. Breaux.  Patient also has an appointment scheduled with Dr. Carnes to evaluate her right hip and her right knee.  I encouraged her to move forward with this.    Patient will follow-up with me in approximately 6 to 8 weeks after completing her lumbar FREDA injection in addition to her evaluation with Dr. Carnes.  If her injection is found to be helpful from a diagnostic standpoint, but her symptoms recur, we can consider surgical discussion for a right L3-4 decompression.  This is also pending an evaluation of her right hip and knee by Dr. Carnes.  After our discussion, the patient articulated understanding of the plan and felt that all questions had been answered satisfactorily. The patient was pleased with the visit and very appreciative for the care rendered.    **Please excuse any errors in grammar or translation related to this dictation. Voice recognition software was utilized to prepare this document. **    F/u 6-8 weeks.       Orders Placed This Encounter    Referral to Pain Medicine       --    Bernard Berkowitz MD  Orthopaedic Spine Surgery  , Department of Orthopaedic Surgery  [facility]

## 2025-03-07 ENCOUNTER — ANESTHESIA EVENT (OUTPATIENT)
Dept: OPERATING ROOM | Facility: HOSPITAL | Age: 65
End: 2025-03-07
Payer: MEDICARE

## 2025-03-11 ENCOUNTER — HOSPITAL ENCOUNTER (OUTPATIENT)
Dept: OPERATING ROOM | Facility: HOSPITAL | Age: 65
Setting detail: OUTPATIENT SURGERY
Discharge: HOME | End: 2025-03-11
Payer: MEDICARE

## 2025-03-11 ENCOUNTER — ANESTHESIA (OUTPATIENT)
Dept: OPERATING ROOM | Facility: HOSPITAL | Age: 65
End: 2025-03-11
Payer: MEDICARE

## 2025-03-11 VITALS
RESPIRATION RATE: 20 BRPM | WEIGHT: 168.43 LBS | TEMPERATURE: 97.2 F | DIASTOLIC BLOOD PRESSURE: 72 MMHG | HEIGHT: 66 IN | SYSTOLIC BLOOD PRESSURE: 141 MMHG | HEART RATE: 74 BPM | OXYGEN SATURATION: 99 % | BODY MASS INDEX: 27.07 KG/M2

## 2025-03-11 VITALS
RESPIRATION RATE: 15 BRPM | TEMPERATURE: 98.8 F | OXYGEN SATURATION: 100 % | DIASTOLIC BLOOD PRESSURE: 65 MMHG | HEART RATE: 84 BPM | SYSTOLIC BLOOD PRESSURE: 117 MMHG

## 2025-03-11 DIAGNOSIS — K44.9 HIATAL HERNIA: ICD-10-CM

## 2025-03-11 PROBLEM — D64.9 ANEMIA: Status: ACTIVE | Noted: 2025-03-11

## 2025-03-11 PROCEDURE — 2720000007 HC OR 272 NO HCPCS: Performed by: ANESTHESIOLOGY

## 2025-03-11 PROCEDURE — A43257 PR EDG DELIVER THERMAL ENERGY SPHNCTR/CARDIA GERD: Performed by: NURSE ANESTHETIST, CERTIFIED REGISTERED

## 2025-03-11 PROCEDURE — A43257 PR EDG DELIVER THERMAL ENERGY SPHNCTR/CARDIA GERD: Performed by: ANESTHESIOLOGY

## 2025-03-11 PROCEDURE — 43239 EGD BIOPSY SINGLE/MULTIPLE: CPT | Performed by: INTERNAL MEDICINE

## 2025-03-11 PROCEDURE — 2500000005 HC RX 250 GENERAL PHARMACY W/O HCPCS: Performed by: SURGERY

## 2025-03-11 PROCEDURE — 3700000001 HC GENERAL ANESTHESIA TIME - INITIAL BASE CHARGE: Performed by: ANESTHESIOLOGY

## 2025-03-11 PROCEDURE — 7100000009 HC PHASE TWO TIME - INITIAL BASE CHARGE: Performed by: ANESTHESIOLOGY

## 2025-03-11 PROCEDURE — 2500000004 HC RX 250 GENERAL PHARMACY W/ HCPCS (ALT 636 FOR OP/ED): Performed by: NURSE ANESTHETIST, CERTIFIED REGISTERED

## 2025-03-11 PROCEDURE — 3600000007 HC OR TIME - EACH INCREMENTAL 1 MINUTE - PROCEDURE LEVEL TWO: Performed by: ANESTHESIOLOGY

## 2025-03-11 PROCEDURE — 2500000004 HC RX 250 GENERAL PHARMACY W/ HCPCS (ALT 636 FOR OP/ED): Performed by: ANESTHESIOLOGY

## 2025-03-11 PROCEDURE — 91010 ESOPHAGUS MOTILITY STUDY: CPT

## 2025-03-11 PROCEDURE — 7100000010 HC PHASE TWO TIME - EACH INCREMENTAL 1 MINUTE: Performed by: ANESTHESIOLOGY

## 2025-03-11 PROCEDURE — 2500000001 HC RX 250 WO HCPCS SELF ADMINISTERED DRUGS (ALT 637 FOR MEDICARE OP): Performed by: NURSE ANESTHETIST, CERTIFIED REGISTERED

## 2025-03-11 PROCEDURE — 3600000002 HC OR TIME - INITIAL BASE CHARGE - PROCEDURE LEVEL TWO: Performed by: ANESTHESIOLOGY

## 2025-03-11 PROCEDURE — 3700000002 HC GENERAL ANESTHESIA TIME - EACH INCREMENTAL 1 MINUTE: Performed by: ANESTHESIOLOGY

## 2025-03-11 RX ORDER — LIDOCAINE HYDROCHLORIDE 20 MG/ML
5 JELLY TOPICAL ONCE
Status: COMPLETED | OUTPATIENT
Start: 2025-03-11 | End: 2025-03-11

## 2025-03-11 RX ORDER — PROPOFOL 10 MG/ML
INJECTION, EMULSION INTRAVENOUS CONTINUOUS PRN
Status: DISCONTINUED | OUTPATIENT
Start: 2025-03-11 | End: 2025-03-11

## 2025-03-11 RX ORDER — SODIUM CHLORIDE, SODIUM LACTATE, POTASSIUM CHLORIDE, CALCIUM CHLORIDE 600; 310; 30; 20 MG/100ML; MG/100ML; MG/100ML; MG/100ML
20 INJECTION, SOLUTION INTRAVENOUS CONTINUOUS
Status: DISCONTINUED | OUTPATIENT
Start: 2025-03-11 | End: 2025-03-12 | Stop reason: HOSPADM

## 2025-03-11 RX ORDER — DROPERIDOL 2.5 MG/ML
0.62 INJECTION, SOLUTION INTRAMUSCULAR; INTRAVENOUS ONCE AS NEEDED
Status: DISCONTINUED | OUTPATIENT
Start: 2025-03-11 | End: 2025-03-12 | Stop reason: HOSPADM

## 2025-03-11 RX ORDER — ALBUTEROL SULFATE 90 UG/1
INHALANT RESPIRATORY (INHALATION) AS NEEDED
Status: DISCONTINUED | OUTPATIENT
Start: 2025-03-11 | End: 2025-03-11

## 2025-03-11 RX ORDER — ONDANSETRON HYDROCHLORIDE 2 MG/ML
4 INJECTION, SOLUTION INTRAVENOUS ONCE AS NEEDED
Status: DISCONTINUED | OUTPATIENT
Start: 2025-03-11 | End: 2025-03-12 | Stop reason: HOSPADM

## 2025-03-11 RX ORDER — FENTANYL CITRATE 50 UG/ML
INJECTION, SOLUTION INTRAMUSCULAR; INTRAVENOUS AS NEEDED
Status: DISCONTINUED | OUTPATIENT
Start: 2025-03-11 | End: 2025-03-11

## 2025-03-11 RX ADMIN — FENTANYL CITRATE 25 MCG: 50 INJECTION, SOLUTION INTRAMUSCULAR; INTRAVENOUS at 11:02

## 2025-03-11 RX ADMIN — FENTANYL CITRATE 25 MCG: 50 INJECTION, SOLUTION INTRAMUSCULAR; INTRAVENOUS at 11:03

## 2025-03-11 RX ADMIN — FENTANYL CITRATE 25 MCG: 50 INJECTION, SOLUTION INTRAMUSCULAR; INTRAVENOUS at 11:43

## 2025-03-11 RX ADMIN — FENTANYL CITRATE 25 MCG: 50 INJECTION, SOLUTION INTRAMUSCULAR; INTRAVENOUS at 11:06

## 2025-03-11 RX ADMIN — FENTANYL CITRATE 25 MCG: 50 INJECTION, SOLUTION INTRAMUSCULAR; INTRAVENOUS at 11:36

## 2025-03-11 RX ADMIN — PROPOFOL 300 MCG/KG/MIN: 10 INJECTION, EMULSION INTRAVENOUS at 11:02

## 2025-03-11 RX ADMIN — FENTANYL CITRATE 25 MCG: 50 INJECTION, SOLUTION INTRAMUSCULAR; INTRAVENOUS at 11:31

## 2025-03-11 RX ADMIN — FENTANYL CITRATE 25 MCG: 50 INJECTION, SOLUTION INTRAMUSCULAR; INTRAVENOUS at 11:23

## 2025-03-11 RX ADMIN — ALBUTEROL SULFATE 6 PUFF: 90 AEROSOL, METERED RESPIRATORY (INHALATION) at 11:27

## 2025-03-11 RX ADMIN — LIDOCAINE HYDROCHLORIDE 1 APPLICATION: 20 JELLY TOPICAL at 07:50

## 2025-03-11 RX ADMIN — SODIUM CHLORIDE, SODIUM LACTATE, POTASSIUM CHLORIDE, AND CALCIUM CHLORIDE 20 ML/HR: .6; .31; .03; .02 INJECTION, SOLUTION INTRAVENOUS at 09:35

## 2025-03-11 RX ADMIN — FENTANYL CITRATE 25 MCG: 50 INJECTION, SOLUTION INTRAMUSCULAR; INTRAVENOUS at 11:04

## 2025-03-11 SDOH — HEALTH STABILITY: MENTAL HEALTH: CURRENT SMOKER: 0

## 2025-03-11 ASSESSMENT — PAIN - FUNCTIONAL ASSESSMENT
PAIN_FUNCTIONAL_ASSESSMENT: 0-10

## 2025-03-11 ASSESSMENT — PAIN SCALES - GENERAL
PAINLEVEL_OUTOF10: 0 - NO PAIN

## 2025-03-11 NOTE — ANESTHESIA POSTPROCEDURE EVALUATION
Patient: Radha Gold    Procedure Summary       Date: 03/11/25 Room / Location: [facility] OR    Anesthesia Start: 1100 Anesthesia Stop: 1153    Procedures:       EGD      BRAVO Diagnosis: Hiatal hernia    Scheduled Providers: Wilson Deras MD; Jose C Morales DO Responsible Provider: Jose C Morales DO    Anesthesia Type: MAC ASA Status: 2            Anesthesia Type: MAC    Vitals Value Taken Time   /66 03/11/25 1203   Temp 37.1 °C (98.8 °F) 03/11/25 1148   Pulse 85 03/11/25 1203   Resp 16 03/11/25 1203   SpO2 98 % 03/11/25 1203       Anesthesia Post Evaluation    Patient location during evaluation: PACU  Patient participation: complete - patient participated  Level of consciousness: awake and alert  Pain management: adequate  Airway patency: patent  Cardiovascular status: acceptable and blood pressure returned to baseline  Respiratory status: acceptable  Hydration status: acceptable  Postoperative Nausea and Vomiting: none        No notable events documented.

## 2025-03-11 NOTE — ANESTHESIA PREPROCEDURE EVALUATION
Patient: Radha Gold    Procedure Information       Date/Time: 03/11/25 1100    Scheduled providers: Wilson Deras MD; Jose C Morales DO    Procedures:       EGD      BRAVO    Location: [facility] OR            Relevant Problems   Anesthesia (within normal limits)      Cardiac   (+) Benign essential HTN   (+) Hyperlipidemia   (+) Hypertension      Pulmonary   (+) Lung nodules      Neuro   (+) Anxiety      GI   (+) Gastroesophageal reflux disease   (+) Hiatal hernia      /Renal (within normal limits)      Liver (within normal limits)      Endocrine   (+) Goiter      Hematology (within normal limits)         Past Surgical History:   Procedure Laterality Date    SHOULDER SURGERY  01/17/2019     Past Medical History:   Diagnosis Date    Anxiety     Colon cancer screening 01/27/2025    Daytime somnolence     Deviated nasal septum     GERD (gastroesophageal reflux disease)     Hiatal hernia     Hyperlipidemia     Hypertension     Left ovarian cyst     Lung nodules     DAMIÁN (obstructive sleep apnea)     Pain in female pelvis     Palpitations     Thyroid nodule        Current Outpatient Medications:     cholecalciferol (Vitamin D-3) 50 MCG (2000 UT) tablet, Take 1 tablet (2,000 Units) by mouth 3 times a week., Disp: , Rfl:     irbesartan (Avapro) 150 mg tablet, Take 1 tablet (150 mg) by mouth once daily., Disp: 100 tablet, Rfl: 2    PARoxetine (Paxil) 20 mg tablet, Take 1 tablet (20 mg) by mouth once daily., Disp: , Rfl:     LORazepam (Ativan) 1 mg tablet, Take 1 tablet (1 mg) by mouth every 12 hours if needed., Disp: , Rfl:     pantoprazole (ProtoNix) 40 mg EC tablet, Take 1 tablet (40 mg) by mouth 2 times a day. Do not crush, chew, or split. / 2 tablets daily, Disp: 180 tablet, Rfl: 1    Current Facility-Administered Medications:     lactated Ringer's infusion, 20 mL/hr, intravenous, Continuous, Brian Chavez MD, Last Rate: 20 mL/hr at 03/11/25 0935, 20 mL/hr at 03/11/25  0935    Facility-Administered Medications Ordered in Other Encounters:     propofol (Diprivan) injection, , , Continuous PRN, Ernst Avalos, APRN-CRNA  Prior to Admission medications    Medication Sig Start Date End Date Taking? Authorizing Provider   cholecalciferol (Vitamin D-3) 50 MCG (2000 UT) tablet Take 1 tablet (2,000 Units) by mouth 3 times a week.   Yes Historical Provider, MD   irbesartan (Avapro) 150 mg tablet Take 1 tablet (150 mg) by mouth once daily. 8/1/24  Yes Adiel Griffith,    PARoxetine (Paxil) 20 mg tablet Take 1 tablet (20 mg) by mouth once daily. 2/4/19  Yes Historical Provider, MD   LORazepam (Ativan) 1 mg tablet Take 1 tablet (1 mg) by mouth every 12 hours if needed.    Historical Provider, MD   pantoprazole (ProtoNix) 40 mg EC tablet Take 1 tablet (40 mg) by mouth 2 times a day. Do not crush, chew, or split. / 2 tablets daily 10/18/24 4/16/25  Celia Hicks MD PhD     Allergies   Allergen Reactions    Adhesive Tape-Silicones Rash     Social History     Tobacco Use    Smoking status: Never    Smokeless tobacco: Never   Substance Use Topics    Alcohol use: Not Currently         Chemistry    Lab Results   Component Value Date/Time     09/24/2024 0851    K 4.6 09/24/2024 0851     09/24/2024 0851    CO2 30 09/24/2024 0851    BUN 18 09/24/2024 0851    CREATININE 0.94 09/24/2024 0851    Lab Results   Component Value Date/Time    CALCIUM 9.5 09/24/2024 0851    ALKPHOS 87 09/24/2024 0851    AST 19 09/24/2024 0851    ALT 20 09/24/2024 0851    BILITOT 0.7 09/24/2024 0851          Lab Results   Component Value Date/Time    WBC 5.6 09/24/2024 0851    HGB 14.9 09/24/2024 0851    HCT 44.7 09/24/2024 0851     09/24/2024 0851     Clinical information reviewed:   Tobacco  Allergies  Meds   Med Hx  Surg Hx   Fam Hx  Soc Hx        NPO Detail:  NPO/Void Status  Carbohydrate Drink Given Prior to Surgery? : N  Date of Last Liquid: 03/10/25  Time of Last Liquid: 2300  Date of Last  Solid: 03/10/25  Time of Last Solid: 2100  Last Intake Type: Solid meal  Time of Last Void: 0700         Physical Exam    Airway  Mallampati: II  TM distance: >3 FB  Neck ROM: full     Cardiovascular   Rhythm: regular     Dental   Comments: Denies   Pulmonary   Breath sounds clear to auscultation     Abdominal            Anesthesia Plan    History of general anesthesia?: yes  History of complications of general anesthesia?: no    ASA 2     MAC   (IV sedation)  The patient is not a current smoker.    intravenous induction   Anesthetic plan and risks discussed with patient.    Plan discussed with CRNA.

## 2025-03-11 NOTE — NURSING NOTE
Patient tolerated procedure well. All swallows completed. Vital signs stable, no complaints. Returned to preop for EGD/BR

## 2025-03-11 NOTE — NURSING NOTE
First BRAVO capsule did not attach to esophagus. Retrieved capsule as foreign body removal. Will repeat BRAVO capsule.

## 2025-03-11 NOTE — DISCHARGE INSTRUCTIONS

## 2025-03-24 PROBLEM — M25.519 SHOULDER PAIN: Status: ACTIVE | Noted: 2025-03-24

## 2025-03-24 PROBLEM — Z86.79 HISTORY OF HYPERTENSION: Status: ACTIVE | Noted: 2025-03-24

## 2025-03-26 ENCOUNTER — OFFICE VISIT (OUTPATIENT)
Dept: CARDIOLOGY | Facility: CLINIC | Age: 65
End: 2025-03-26
Payer: MEDICARE

## 2025-03-26 VITALS
BODY MASS INDEX: 27.1 KG/M2 | WEIGHT: 168.6 LBS | HEIGHT: 66 IN | HEART RATE: 77 BPM | SYSTOLIC BLOOD PRESSURE: 137 MMHG | OXYGEN SATURATION: 97 % | DIASTOLIC BLOOD PRESSURE: 82 MMHG

## 2025-03-26 DIAGNOSIS — E78.5 HYPERLIPIDEMIA, UNSPECIFIED HYPERLIPIDEMIA TYPE: ICD-10-CM

## 2025-03-26 DIAGNOSIS — R94.31 ABNORMAL EKG: Primary | ICD-10-CM

## 2025-03-26 DIAGNOSIS — I10 BENIGN ESSENTIAL HTN: ICD-10-CM

## 2025-03-26 PROCEDURE — 1126F AMNT PAIN NOTED NONE PRSNT: CPT | Performed by: NURSE PRACTITIONER

## 2025-03-26 PROCEDURE — 3075F SYST BP GE 130 - 139MM HG: CPT | Performed by: NURSE PRACTITIONER

## 2025-03-26 PROCEDURE — 1160F RVW MEDS BY RX/DR IN RCRD: CPT | Performed by: NURSE PRACTITIONER

## 2025-03-26 PROCEDURE — 93005 ELECTROCARDIOGRAM TRACING: CPT | Performed by: NURSE PRACTITIONER

## 2025-03-26 PROCEDURE — 99214 OFFICE O/P EST MOD 30 MIN: CPT | Performed by: NURSE PRACTITIONER

## 2025-03-26 PROCEDURE — 1036F TOBACCO NON-USER: CPT | Performed by: NURSE PRACTITIONER

## 2025-03-26 PROCEDURE — 1159F MED LIST DOCD IN RCRD: CPT | Performed by: NURSE PRACTITIONER

## 2025-03-26 PROCEDURE — G2211 COMPLEX E/M VISIT ADD ON: HCPCS | Performed by: NURSE PRACTITIONER

## 2025-03-26 PROCEDURE — 3008F BODY MASS INDEX DOCD: CPT | Performed by: NURSE PRACTITIONER

## 2025-03-26 PROCEDURE — 3079F DIAST BP 80-89 MM HG: CPT | Performed by: NURSE PRACTITIONER

## 2025-03-26 ASSESSMENT — ENCOUNTER SYMPTOMS
OCCASIONAL FEELINGS OF UNSTEADINESS: 0
CARDIOVASCULAR NEGATIVE: 1
NEUROLOGICAL NEGATIVE: 1
GASTROINTESTINAL NEGATIVE: 1
LOSS OF SENSATION IN FEET: 0
RESPIRATORY NEGATIVE: 1
DEPRESSION: 0
MUSCULOSKELETAL NEGATIVE: 1
CONSTITUTIONAL NEGATIVE: 1

## 2025-03-26 ASSESSMENT — PAIN SCALES - GENERAL: PAINLEVEL_OUTOF10: 0-NO PAIN

## 2025-03-26 NOTE — PROGRESS NOTES
"Chief Complaint:   Follow-up for palpitations, hypertension, hyperlipidemia    History Of Present Illness:    .This is a 64 y/o female here today for a Cardiology follow up visit. She denies chest pain, sob, palpitations or lower leg edema. Had a CT scan last Fall showing mild abdominal aortic atherosclerotic calcifications.  Has not been seen in almost two years.           Last Recorded Vitals:  Blood pressure 137/82, pulse 77, height 1.676 m (5' 6\"), weight 76.5 kg (168 lb 9.6 oz), SpO2 97%.     Past Medical History:  Past Medical History:   Diagnosis Date    Anxiety     Colon cancer screening 01/27/2025    Daytime somnolence     Deviated nasal septum     GERD (gastroesophageal reflux disease)     Hiatal hernia     Hyperlipidemia     Hypertension     Left ovarian cyst     Lung nodules     DAMIÁN (obstructive sleep apnea)     Pain in female pelvis     Palpitations     Thyroid nodule         Past Surgical History:  Past Surgical History:   Procedure Laterality Date    SHOULDER SURGERY  01/17/2019       Social History:  Social History     Socioeconomic History    Marital status: Single   Tobacco Use    Smoking status: Never    Smokeless tobacco: Never   Vaping Use    Vaping status: Never Used   Substance and Sexual Activity    Alcohol use: Not Currently    Drug use: Never       Family History:  Family History   Problem Relation Name Age of Onset    Hyperlipidemia Mother      Coronary artery disease Father      Hyperlipidemia Sister      Hyperlipidemia Brother           Allergies:  Adhesive tape-silicones    Outpatient Medications:  Current Outpatient Medications   Medication Sig Dispense Refill    cholecalciferol (Vitamin D-3) 50 MCG (2000 UT) tablet Take 1 tablet (2,000 Units) by mouth 3 times a week.      irbesartan (Avapro) 150 mg tablet Take 1 tablet (150 mg) by mouth once daily. 100 tablet 2    LORazepam (Ativan) 1 mg tablet Take 1 tablet (1 mg) by mouth every 12 hours if needed.      pantoprazole (ProtoNix) 40 mg " EC tablet Take 1 tablet (40 mg) by mouth 2 times a day. Do not crush, chew, or split. / 2 tablets daily 180 tablet 1    PARoxetine (Paxil) 20 mg tablet Take 1 tablet (20 mg) by mouth once daily.       No current facility-administered medications for this visit.        Physical Exam:  Cardiovascular:      PMI at left midclavicular line. Normal rate. Regular rhythm. Normal S1. Normal S2.       Murmurs: There is no murmur.      No gallop.  No click. No rub.   Pulses:     Intact distal pulses.   Edema:     Peripheral edema absent.         ROS:  Review of Systems   Constitutional: Negative.   Cardiovascular: Negative.    Respiratory: Negative.     Skin: Negative.    Musculoskeletal: Negative.    Gastrointestinal: Negative.    Genitourinary: Negative.    Neurological: Negative.           Last Labs:  CBC -  Lab Results   Component Value Date    WBC 5.6 09/24/2024    HGB 14.9 09/24/2024    HCT 44.7 09/24/2024    MCV 89 09/24/2024     09/24/2024       CMP -  Lab Results   Component Value Date    CALCIUM 9.5 09/24/2024    PROT 6.8 09/24/2024    ALBUMIN 4.5 09/24/2024    AST 19 09/24/2024    ALT 20 09/24/2024    ALKPHOS 87 09/24/2024    BILITOT 0.7 09/24/2024       LIPID PANEL -   Lab Results   Component Value Date    CHOL 340 (H) 08/19/2024    TRIG 150 08/19/2024    HDL 54.0 08/19/2024    CHHDL 6.3 08/19/2024    LDLF 186 (H) 01/18/2019    VLDL 15 01/18/2019       RENAL FUNCTION PANEL -   Lab Results   Component Value Date    GLUCOSE 91 09/24/2024     09/24/2024    K 4.6 09/24/2024     09/24/2024    CO2 30 09/24/2024    ANIONGAP 11 09/24/2024    BUN 18 09/24/2024    CREATININE 0.94 09/24/2024    CALCIUM 9.5 09/24/2024    ALBUMIN 4.5 09/24/2024        Lab Results   Component Value Date    HGBA1C 6.1 (H) 08/19/2024         Assessment/Plan   Problem List Items Addressed This Visit    None    Assessment:     1. Palpitations/tachycardia/anxiety. Apparently patient experienced tachycardia after shoulder surgery  11/2018 and came in for consultation. ECG at that time showed sinus tachycardia, 109 bpm. Zio patch worn 01/2019 showed 1 VT run of 4 beats to a max of 143 and SVT 6 beats to a max of 182 and longest beat was 8 with 138 beat per minute rate. There was otherwise low-grade atrial ectopic activity with rare isolated PACs atrial couplets and triplets. There was also low grade ventricular ectopic activity except for the single run of ventricular tachycardia. That point in time the patient was initially placed on metoprolol which was poorly tolerated due to insomnia. She was switched from the metoprolol  mg daily to bystolic 10 mg daily but still had difficulty with sleep. She was subsequently switched to amlodipine 5 mg daily with a dose reduction at 2.5 mg daily. The patient currently is asymptomatic with no palpitations whatsoever. Of note she did have a CT coronary calcium score of only 0 when checked on 02/04/2019. She also had an echocardiogram performed on 02/21/2019 which was unremarkable with a preserved LV ejection fraction of 65%. In the absence of any coronary artery disease or LV dysfunction it is unlikely that she has a significant arrhythmia. Is off beta blockers.     2. CAD. CT cardiac score done 02/2019 was 0.  Will recheck as she has quite high untreated, by her choice, cholesterol. Ecg done today looked stable from prior.     3. Hypertension. BP appears to be adequately controlled with the irbesartan 150 mg daily.     4. Anxiety. The patient is presently on a combination of Paxil and as needed lorazepam.     5. Hyperlipidemia. Patient had lab work performed on 03/02/2020 including a normal CBC and SMA panel. The lipid panel was notable for cholesterol 272  HDL 71 triglyceride 78. The patient had more recent lab work on 3/18/2021 including a lipid panel with cholesterol 287  HDL 67 triglyceride 92. The CBC and SMA panels were normal with hematocrit being 45.0. Vitamin D level was 34  and the TSH was 2.43. Given the findings of her CT coronary calcium score statin therapy at this point will be deferred. Given the magnitude of the patient's lipid values however will perhaps repeat a CT coronary calcium score to ensure that she does not require therapy.  Adverse to statins and have given information on Nexlizet.  Recent chol 340 and .  Strongly encouraged treatment.     6. Vitamin D deficiency. Patient is presently on vitamin D supplement.     7. Cervical spinal DJD. The patient has had some ongoing issues with neck pain. She did have a cervical spine x-ray performed recently which demonstrated moderate disc space narrowing at C5-C6 and C6-C7 with moderate facet hypertrophic change and neuroforaminal narrowing. The patient pursued physical therapy.            Bessy Stapleton, APRN-CNP

## 2025-03-27 LAB
ATRIAL RATE: 69 BPM
P AXIS: 36 DEGREES
P OFFSET: 214 MS
P ONSET: 172 MS
PR INTERVAL: 106 MS
Q ONSET: 225 MS
QRS COUNT: 12 BEATS
QRS DURATION: 68 MS
QT INTERVAL: 408 MS
QTC CALCULATION(BAZETT): 437 MS
QTC FREDERICIA: 427 MS
R AXIS: 5 DEGREES
T AXIS: -16 DEGREES
T OFFSET: 429 MS
VENTRICULAR RATE: 69 BPM

## 2025-03-28 ENCOUNTER — APPOINTMENT (OUTPATIENT)
Dept: ORTHOPEDIC SURGERY | Facility: CLINIC | Age: 65
End: 2025-03-28
Payer: MEDICARE

## 2025-04-04 ENCOUNTER — APPOINTMENT (OUTPATIENT)
Dept: ORTHOPEDIC SURGERY | Facility: CLINIC | Age: 65
End: 2025-04-04
Payer: MEDICARE

## 2025-04-09 ENCOUNTER — HOSPITAL ENCOUNTER (OUTPATIENT)
Dept: RADIOLOGY | Facility: CLINIC | Age: 65
Discharge: HOME | End: 2025-04-09
Payer: MEDICARE

## 2025-04-09 ENCOUNTER — OFFICE VISIT (OUTPATIENT)
Dept: PAIN MEDICINE | Facility: CLINIC | Age: 65
End: 2025-04-09
Payer: MEDICARE

## 2025-04-09 VITALS — SYSTOLIC BLOOD PRESSURE: 112 MMHG | OXYGEN SATURATION: 99 % | HEART RATE: 81 BPM | DIASTOLIC BLOOD PRESSURE: 68 MMHG

## 2025-04-09 DIAGNOSIS — M16.11 PRIMARY OSTEOARTHRITIS OF RIGHT HIP: Primary | ICD-10-CM

## 2025-04-09 DIAGNOSIS — M48.061 LUMBAR STENOSIS WITHOUT NEUROGENIC CLAUDICATION: ICD-10-CM

## 2025-04-09 DIAGNOSIS — M25.551 RIGHT HIP PAIN: ICD-10-CM

## 2025-04-09 PROCEDURE — G2211 COMPLEX E/M VISIT ADD ON: HCPCS | Performed by: ANESTHESIOLOGY

## 2025-04-09 PROCEDURE — 1160F RVW MEDS BY RX/DR IN RCRD: CPT | Performed by: ANESTHESIOLOGY

## 2025-04-09 PROCEDURE — 1125F AMNT PAIN NOTED PAIN PRSNT: CPT | Performed by: ANESTHESIOLOGY

## 2025-04-09 PROCEDURE — 73502 X-RAY EXAM HIP UNI 2-3 VIEWS: CPT | Mod: RT

## 2025-04-09 PROCEDURE — 73502 X-RAY EXAM HIP UNI 2-3 VIEWS: CPT | Mod: RIGHT SIDE | Performed by: RADIOLOGY

## 2025-04-09 PROCEDURE — 3078F DIAST BP <80 MM HG: CPT | Performed by: ANESTHESIOLOGY

## 2025-04-09 PROCEDURE — 99214 OFFICE O/P EST MOD 30 MIN: CPT | Performed by: ANESTHESIOLOGY

## 2025-04-09 PROCEDURE — 3074F SYST BP LT 130 MM HG: CPT | Performed by: ANESTHESIOLOGY

## 2025-04-09 PROCEDURE — 1159F MED LIST DOCD IN RCRD: CPT | Performed by: ANESTHESIOLOGY

## 2025-04-09 PROCEDURE — 99204 OFFICE O/P NEW MOD 45 MIN: CPT | Performed by: ANESTHESIOLOGY

## 2025-04-09 ASSESSMENT — PAIN SCALES - GENERAL
PAINLEVEL_OUTOF10: 2
PAINLEVEL_OUTOF10: 2

## 2025-04-09 ASSESSMENT — ENCOUNTER SYMPTOMS
ENDOCRINE NEGATIVE: 1
HEMATOLOGIC/LYMPHATIC NEGATIVE: 1
DEPRESSION: 0
CARDIOVASCULAR NEGATIVE: 1
GASTROINTESTINAL NEGATIVE: 1
CONSTITUTIONAL NEGATIVE: 1
LOSS OF SENSATION IN FEET: 0
BACK PAIN: 1
OCCASIONAL FEELINGS OF UNSTEADINESS: 0
WEAKNESS: 1
NUMBNESS: 0
ARTHRALGIAS: 1
PSYCHIATRIC NEGATIVE: 1
RESPIRATORY NEGATIVE: 1
EYES NEGATIVE: 1

## 2025-04-09 ASSESSMENT — PAIN DESCRIPTION - DESCRIPTORS: DESCRIPTORS: STABBING

## 2025-04-09 ASSESSMENT — PATIENT HEALTH QUESTIONNAIRE - PHQ9
SUM OF ALL RESPONSES TO PHQ9 QUESTIONS 1 AND 2: 0
2. FEELING DOWN, DEPRESSED OR HOPELESS: NOT AT ALL
1. LITTLE INTEREST OR PLEASURE IN DOING THINGS: NOT AT ALL

## 2025-04-09 ASSESSMENT — LIFESTYLE VARIABLES: TOTAL SCORE: 5

## 2025-04-09 ASSESSMENT — PAIN - FUNCTIONAL ASSESSMENT: PAIN_FUNCTIONAL_ASSESSMENT: 0-10

## 2025-04-09 NOTE — PROGRESS NOTES
PAIN MANAGEMENT NEW PATIENT OFFICE NOTE    Date of Service: 4/9/2025    SUBJECTIVE    CHIEF COMPLAINT: LBP    HISTORY OF PRESENT ILLNESS    Radha Gold is a 65 y.o. female former  with PMH NOELLE, hiatal hernia, HTN who presents as new patient referred by ortho spine Dr Berkowitz with LB pain.    Pt describes LB pain since many years without inciting trauma/incident. Burning pain radiates to R groin and occasionally to R buttock and R anterior thigh knee. RLE pain assoc with balance issues, weakness. Pain is worse with standing/walking. Pain is alleviated with sitting down. Pt has tried Tylenol, >6 w PT. Saw Dr Berkowitz 3/5 who reviewed potential OA on RLE radicular sx and rec'd pain management, potential L3-4 FREDA before considering L3-4 decompression surg. Referred to Dr Carnes, but pt missed appointment and hopes to r/s.    Pt denies new-onset numbness, bowel/bladder incontinence.  Pt denies recent infection, allergy to Latex/iodine/contrast. Patient is currently taking the following blood thinner(s): N/A    REVIEW OF SYSTEMS  Review of Systems   Constitutional: Negative.    HENT: Negative.     Eyes: Negative.    Respiratory: Negative.     Cardiovascular: Negative.    Gastrointestinal: Negative.    Endocrine: Negative.    Musculoskeletal:  Positive for arthralgias and back pain.   Skin: Negative.    Neurological:  Positive for weakness. Negative for numbness.   Hematological: Negative.    Psychiatric/Behavioral: Negative.         PAST MEDICAL HISTORY  Past Medical History:   Diagnosis Date    Anxiety     Colon cancer screening 01/27/2025    Daytime somnolence     Deviated nasal septum     GERD (gastroesophageal reflux disease)     Hiatal hernia     Hyperlipidemia     Hypertension     Left ovarian cyst     Lung nodules     DAMIÁN (obstructive sleep apnea)     Pain in female pelvis     Palpitations     Thyroid nodule      Past Surgical History:   Procedure Laterality Date    SHOULDER SURGERY  01/17/2019      Family History   Problem Relation Name Age of Onset    Hyperlipidemia Mother      Coronary artery disease Father      Hyperlipidemia Sister      Hyperlipidemia Brother         CURRENT MEDICATIONS  Current Outpatient Medications   Medication Sig Dispense Refill    cholecalciferol (Vitamin D-3) 50 MCG (2000 UT) tablet Take 1 tablet (50 mcg) by mouth 3 times a week.      irbesartan (Avapro) 150 mg tablet Take 1 tablet (150 mg) by mouth once daily. 100 tablet 2    LORazepam (Ativan) 1 mg tablet Take 1 tablet (1 mg) by mouth every 12 hours if needed.      pantoprazole (ProtoNix) 40 mg EC tablet Take 1 tablet (40 mg) by mouth 2 times a day. Do not crush, chew, or split. / 2 tablets daily 180 tablet 1    PARoxetine (Paxil) 20 mg tablet Take 1 tablet (20 mg) by mouth once daily.       No current facility-administered medications for this visit.       ALLERGIES AND DRUG REACTIONS  Allergies   Allergen Reactions    Adhesive Tape-Silicones Rash          OBJECTIVE  Visit Vitals  /68   Pulse 81   SpO2 99%   OB Status Postmenopausal   Smoking Status Never       Last Recorded Pain Score (if available):           Pain Score:   2     Physical Exam  Vitals and nursing note reviewed.     General: Sitting in chair, NAD  Head: NCAT  Eyes: Sclera/conjunctiva clear, EOMI, PERRL  Nose/mouth: MMM  CV: Good distal pulses  Lungs: Good/equal chest excursion  Abdomen: Soft, ND  Ext: No cyanosis/edema  MSK: L-spine alignment: unremarkable, BL paraspinal m TTP, L-spine ROM: extension/flexion lmtd by pain    Neuro: AAOx3, CN grossly nl  Dermatome sensation to light touch  LEFT L1 (lower pelvis/upper thigh): WNL    RIGHT L1: WNL      LEFT L2 (upper thigh): WNL       RIGHT: L2:WNL      LEFT L3 (medial knee): WNL       RIGHT L3: WNL      LEFT L4 (superior medial malleolus): WNL       RIGHT L4: WNL      LEFT L5 (dorsal foot): WNL       RIGHT L5: WNL      LEFT S1 (lateral foot): WNL     RIGHT S1: WNL      LEFT S2 (popliteal fossa): WNL     "RIGHT S2: WNL        Motor strength  LEFT L2 (hip flexion): 5/5   RIGHT L2: 5/5  LEFT L3 (knee extension): 5/5     RIGHT L3: 5/5  LEFT L4 (dorsiflexion): 5/5     RIGHT L4: 5/5  LEFT L5 (EHL extension): 5/5     RIGHT L5: 5/5  LEFT S1 (plantarflexion): 5/5     RIGHT S1: 5/5  LEFT S2 (knee flexion): 5/5      RIGHT S2: 5/5    Special testing  DTR unremarkable  Seated slump test neg BL  Clonus: neg BL  Babinski: neg BL    Psych: affect nl  Skin: no rash/lesions      REVIEW OF LABORATORY DATA  I have reviewed the following lab results:  WBC   Date Value Ref Range Status   09/24/2024 5.6 4.4 - 11.3 x10*3/uL Final     RBC   Date Value Ref Range Status   09/24/2024 5.05 4.00 - 5.20 x10*6/uL Final     Hemoglobin   Date Value Ref Range Status   09/24/2024 14.9 12.0 - 16.0 g/dL Final     Hematocrit   Date Value Ref Range Status   09/24/2024 44.7 36.0 - 46.0 % Final     MCV   Date Value Ref Range Status   09/24/2024 89 80 - 100 fL Final     MCH   Date Value Ref Range Status   09/24/2024 29.5 26.0 - 34.0 pg Final     MCHC   Date Value Ref Range Status   09/24/2024 33.3 32.0 - 36.0 g/dL Final     RDW   Date Value Ref Range Status   09/24/2024 13.4 11.5 - 14.5 % Final     Platelets   Date Value Ref Range Status   09/24/2024 319 150 - 450 x10*3/uL Final     MPV   Date Value Ref Range Status   02/13/2023 9.9 7.0 - 12.6 CU Final     Sodium   Date Value Ref Range Status   09/24/2024 140 136 - 145 mmol/L Final     Potassium   Date Value Ref Range Status   09/24/2024 4.6 3.5 - 5.3 mmol/L Final     Bicarbonate   Date Value Ref Range Status   09/24/2024 30 21 - 32 mmol/L Final     Urea Nitrogen   Date Value Ref Range Status   09/24/2024 18 6 - 23 mg/dL Final     Calcium   Date Value Ref Range Status   09/24/2024 9.5 8.6 - 10.3 mg/dL Final     No results found for: \"PROTIME\", \"PTT\", \"INR\", \"FIBRINOGEN\"      REVIEW OF RADIOLOGY   I have reviewed the following:  Radiology Studies           MRI L-spine 2/19/25:           ASSESSMENT & " PLAN  Radha Gold is a 65 y.o. female with PMH former  with PMH NOELLE, hiatal hernia, HTN who presents as new patient referred by ortho spine Dr Berkowitz with LB pain.    1) LBP  -Yrs with radiation to R buttock/groin/anterior thigh assoc with subj weakness w/o obj deficit  -Refractive to yrs of conservative tx including Tylenol, >6 w PT  -Reviewed/discussed MRI L-spine 2/19/25: rotary levoscoliosis, R RICARDO/foraminal extrusion at L3-4 compressing R L4 n root, mild R L3 NFS, mod left L5 NFS contacting descending L S1 n root  -Saw Dr Berkowitz 3/5/25: potential R hip OA on radicular sx, suggested L3-4 FREDA before considering L3-4 decompression  -Reproducible R hip pain predominates today- see below  -Consider L3-4 FREDA     2) R hip pain  -likely 2/2 OA, reproducible on exam  -Refractive to yrs of conservative tx including rest, Tylenol, >6 w PT  -Reviewed/discussed R hip XR 4/9/25: mod OA  -Schedule R hip CSI w/ fluoro to target pain generator as seen on imaging and minimize risk/likelihood of chronic opioid use and/or surgery            Discussed procedure risks/benefits in detail with patient. Pt meets medical necessity for procedure due to failure of conservative measures. Reviewed procedural risks including bleeding, infection, nerve damage, paralysis. Also reviewed mitigating factors such as screening for infection/blood thinner use, sterile precautions, and image-guidance when applicable. All questions answered. Pt/guardian expressed understanding and choose to proceed    Today's visit involved establishment of care and a complete examination with review of records. In the context of the complexity of this patient's chronic pain diagnosis, long-term expectations and care planning discussed. Imaging studies ordered are placed do elucidate the patient's diagnosis, but also to evaluate the patient's candidacy for procedural and surgical interventions. The risks and benefits of these potential interventions  are detailed as above.         Bekah Breaux MD  Anesthesiologist & Interventional Pain Physician   [facility] [place]  O: [phone]  F: [phone]  2:29 PM  04/09/25

## 2025-04-09 NOTE — LETTER
April 11, 2025     Bernard CANALES MD  [address] [facility] [address]  [place] [address]    Patient: Radha Gold   YOB: 1960   Date of Visit: 4/9/2025       Dear Dr. Bernard CANALES MD:    Thank you for referring Radha Gold to me for evaluation. Below are my notes for this consultation.  If you have questions, please do not hesitate to call me. I look forward to following your patient along with you.       Sincerely,     Bekah Breaux MD      CC: No Recipients  ______________________________________________________________________________________    PAIN MANAGEMENT NEW PATIENT OFFICE NOTE    Date of Service: 4/9/2025    SUBJECTIVE    CHIEF COMPLAINT: LBP    HISTORY OF PRESENT ILLNESS    Radha Gold is a 65 y.o. female former  with PMH NOELLE, hiatal hernia, HTN who presents as new patient referred by ortho spine Dr Berkowitz with LB pain.    Pt describes LB pain since many years without inciting trauma/incident. Burning pain radiates to R groin and occasionally to R buttock and R anterior thigh knee. RLE pain assoc with balance issues, weakness. Pain is worse with standing/walking. Pain is alleviated with sitting down. Pt has tried Tylenol, >6 w PT. Saw Dr Berkowitz 3/5 who reviewed potential OA on RLE radicular sx and rec'd pain management, potential L3-4 FREDA before considering L3-4 decompression surg. Referred to Dr Carnes, but pt missed appointment and hopes to r/s.    Pt denies new-onset numbness, bowel/bladder incontinence.  Pt denies recent infection, allergy to Latex/iodine/contrast. Patient is currently taking the following blood thinner(s): N/A    REVIEW OF SYSTEMS  Review of Systems   Constitutional: Negative.    HENT: Negative.     Eyes: Negative.    Respiratory: Negative.     Cardiovascular: Negative.    Gastrointestinal: Negative.    Endocrine: Negative.    Musculoskeletal:  Positive for arthralgias and back pain.   Skin: Negative.    Neurological:  Positive for weakness.  Negative for numbness.   Hematological: Negative.    Psychiatric/Behavioral: Negative.         PAST MEDICAL HISTORY  Past Medical History:   Diagnosis Date   • Anxiety    • Colon cancer screening 01/27/2025   • Daytime somnolence    • Deviated nasal septum    • GERD (gastroesophageal reflux disease)    • Hiatal hernia    • Hyperlipidemia    • Hypertension    • Left ovarian cyst    • Lung nodules    • DAMIÁN (obstructive sleep apnea)    • Pain in female pelvis    • Palpitations    • Thyroid nodule      Past Surgical History:   Procedure Laterality Date   • SHOULDER SURGERY  01/17/2019     Family History   Problem Relation Name Age of Onset   • Hyperlipidemia Mother     • Coronary artery disease Father     • Hyperlipidemia Sister     • Hyperlipidemia Brother         CURRENT MEDICATIONS  Current Outpatient Medications   Medication Sig Dispense Refill   • cholecalciferol (Vitamin D-3) 50 MCG (2000 UT) tablet Take 1 tablet (50 mcg) by mouth 3 times a week.     • irbesartan (Avapro) 150 mg tablet Take 1 tablet (150 mg) by mouth once daily. 100 tablet 2   • LORazepam (Ativan) 1 mg tablet Take 1 tablet (1 mg) by mouth every 12 hours if needed.     • pantoprazole (ProtoNix) 40 mg EC tablet Take 1 tablet (40 mg) by mouth 2 times a day. Do not crush, chew, or split. / 2 tablets daily 180 tablet 1   • PARoxetine (Paxil) 20 mg tablet Take 1 tablet (20 mg) by mouth once daily.       No current facility-administered medications for this visit.       ALLERGIES AND DRUG REACTIONS  Allergies   Allergen Reactions   • Adhesive Tape-Silicones Rash          OBJECTIVE  Visit Vitals  /68   Pulse 81   SpO2 99%   OB Status Postmenopausal   Smoking Status Never       Last Recorded Pain Score (if available):           Pain Score:   2     Physical Exam  Vitals and nursing note reviewed.     General: Sitting in chair, NAD  Head: NCAT  Eyes: Sclera/conjunctiva clear, EOMI, PERRL  Nose/mouth: MMM  CV: Good distal pulses  Lungs: Good/equal chest  excursion  Abdomen: Soft, ND  Ext: No cyanosis/edema  MSK: L-spine alignment: unremarkable, BL paraspinal m TTP, L-spine ROM: extension/flexion lmtd by pain    Neuro: AAOx3, CN grossly nl  Dermatome sensation to light touch  LEFT L1 (lower pelvis/upper thigh): WNL    RIGHT L1: WNL      LEFT L2 (upper thigh): WNL       RIGHT: L2:WNL      LEFT L3 (medial knee): WNL       RIGHT L3: WNL      LEFT L4 (superior medial malleolus): WNL       RIGHT L4: WNL      LEFT L5 (dorsal foot): WNL       RIGHT L5: WNL      LEFT S1 (lateral foot): WNL     RIGHT S1: WNL      LEFT S2 (popliteal fossa): WNL    RIGHT S2: WNL        Motor strength  LEFT L2 (hip flexion): 5/5   RIGHT L2: 5/5  LEFT L3 (knee extension): 5/5     RIGHT L3: 5/5  LEFT L4 (dorsiflexion): 5/5     RIGHT L4: 5/5  LEFT L5 (EHL extension): 5/5     RIGHT L5: 5/5  LEFT S1 (plantarflexion): 5/5     RIGHT S1: 5/5  LEFT S2 (knee flexion): 5/5      RIGHT S2: 5/5    Special testing  DTR unremarkable  Seated slump test neg BL  Clonus: neg BL  Babinski: neg BL    Psych: affect nl  Skin: no rash/lesions      REVIEW OF LABORATORY DATA  I have reviewed the following lab results:  WBC   Date Value Ref Range Status   09/24/2024 5.6 4.4 - 11.3 x10*3/uL Final     RBC   Date Value Ref Range Status   09/24/2024 5.05 4.00 - 5.20 x10*6/uL Final     Hemoglobin   Date Value Ref Range Status   09/24/2024 14.9 12.0 - 16.0 g/dL Final     Hematocrit   Date Value Ref Range Status   09/24/2024 44.7 36.0 - 46.0 % Final     MCV   Date Value Ref Range Status   09/24/2024 89 80 - 100 fL Final     MCH   Date Value Ref Range Status   09/24/2024 29.5 26.0 - 34.0 pg Final     MCHC   Date Value Ref Range Status   09/24/2024 33.3 32.0 - 36.0 g/dL Final     RDW   Date Value Ref Range Status   09/24/2024 13.4 11.5 - 14.5 % Final     Platelets   Date Value Ref Range Status   09/24/2024 319 150 - 450 x10*3/uL Final     MPV   Date Value Ref Range Status   02/13/2023 9.9 7.0 - 12.6 CU Final     Sodium   Date Value  "Ref Range Status   09/24/2024 140 136 - 145 mmol/L Final     Potassium   Date Value Ref Range Status   09/24/2024 4.6 3.5 - 5.3 mmol/L Final     Bicarbonate   Date Value Ref Range Status   09/24/2024 30 21 - 32 mmol/L Final     Urea Nitrogen   Date Value Ref Range Status   09/24/2024 18 6 - 23 mg/dL Final     Calcium   Date Value Ref Range Status   09/24/2024 9.5 8.6 - 10.3 mg/dL Final     No results found for: \"PROTIME\", \"PTT\", \"INR\", \"FIBRINOGEN\"      REVIEW OF RADIOLOGY   I have reviewed the following:  Radiology Studies           MRI L-spine 2/19/25:           ASSESSMENT & PLAN  Radha Gold is a 65 y.o. female with PMH former  with PMH NOELLE, hiatal hernia, HTN who presents as new patient referred by ortho spine Dr Berkowitz with LB pain.    1) LBP  -Yrs with radiation to R buttock/groin/anterior thigh assoc with subj weakness w/o obj deficit  -Refractive to yrs of conservative tx including Tylenol, >6 w PT  -Reviewed/discussed MRI L-spine 2/19/25: rotary levoscoliosis, R RICARDO/foraminal extrusion at L3-4 compressing R L4 n root, mild R L3 NFS, mod left L5 NFS contacting descending L S1 n root  -Saw Dr Berkowitz 3/5/25: potential R hip OA on radicular sx, suggested L3-4 FREDA before considering L3-4 decompression  -Reproducible R hip pain predominates today- see below  -Consider L3-4 FREDA     2) R hip pain  -likely 2/2 OA, reproducible on exam  -Refractive to yrs of conservative tx including rest, Tylenol, >6 w PT  -Reviewed/discussed R hip XR 4/9/25: mod OA  -Schedule R hip CSI w/ fluoro to target pain generator as seen on imaging and minimize risk/likelihood of chronic opioid use and/or surgery            Discussed procedure risks/benefits in detail with patient. Pt meets medical necessity for procedure due to failure of conservative measures. Reviewed procedural risks including bleeding, infection, nerve damage, paralysis. Also reviewed mitigating factors such as screening for infection/blood thinner use, " sterile precautions, and image-guidance when applicable. All questions answered. Pt/guardian expressed understanding and choose to proceed    Today's visit involved establishment of care and a complete examination with review of records. In the context of the complexity of this patient's chronic pain diagnosis, long-term expectations and care planning discussed. Imaging studies ordered are placed do elucidate the patient's diagnosis, but also to evaluate the patient's candidacy for procedural and surgical interventions. The risks and benefits of these potential interventions are detailed as above.         Bekah Breaux MD  Anesthesiologist & Interventional Pain Physician   [facility]  O: [phone]  F: [phone]  2:29 PM  04/09/25

## 2025-04-11 ENCOUNTER — OFFICE VISIT (OUTPATIENT)
Dept: SURGERY | Facility: CLINIC | Age: 65
End: 2025-04-11
Payer: MEDICARE

## 2025-04-11 DIAGNOSIS — K44.9 HIATAL HERNIA: Primary | ICD-10-CM

## 2025-04-11 PROBLEM — M48.061 LUMBAR STENOSIS WITHOUT NEUROGENIC CLAUDICATION: Status: ACTIVE | Noted: 2025-04-11

## 2025-04-11 PROBLEM — M16.11 PRIMARY OSTEOARTHRITIS OF RIGHT HIP: Status: ACTIVE | Noted: 2025-04-11

## 2025-04-11 PROCEDURE — 99215 OFFICE O/P EST HI 40 MIN: CPT | Performed by: SURGERY

## 2025-04-11 NOTE — PROGRESS NOTES
General Surgery Follow-Up Note    Referring Provider:   Adiel Griffith DO      Chief Complaint:  Hiatal hernia    History of Present Illness:  This is a 65 y.o. female who presents for refractory hiatal hernia symptoms.  She was last seen 6 months ago.  At that time, we discussed that she had classic heartburn and reflux symptoms from a hiatal hernia, but did not had a workup completed.  We started her on pantoprazole 40 mg twice daily at that time.  She reports that she has been diligent with the pantoprazole 40 twice daily since then.  She did complete the workup.  She reports that the period where she needed to stop her antacids for the problem getting was quite bad.  She reports that her symptoms are now getting worse despite the pantoprazole twice daily.  She also still has a persistent cough.      Vitals:   There were no vitals filed for this visit.      Physical Exam:  General: No acute distress. Sitting up in bed.   Neuro: Alert and oriented ×3. Follows commands.  Head: Atraumatic  Eyes: Pupils equal reactive to light. Extraocular motions intact.  Ears: Hears normal speaking voice.  Mouth, Nose, Throat: Mucous membranes moist.  Normal dentition.  Neck: Supple. No appreciable masses.  Chest: No crepitus.  No appreciable scars.  Heart: Regular rate and rhythm.  Lung: Clear to auscultation bilaterally.  Vascular: No carotid bruits.  Palpable radial pulses bilaterally.  Abdomen: Soft. Nondistended. Nontender.    Musculoskeletal: Moves all extremities.  Normal range of motion.  Lymphatic: No palpable lymph nodes.  Skin: No rashes or lesions.  Psychological: Normal affect    Labs:  CBC:   Lab Results   Component Value Date    WBC 5.6 09/24/2024    RBC 5.05 09/24/2024    HGB 14.9 09/24/2024    HCT 44.7 09/24/2024     09/24/2024       RFP:   Lab Results   Component Value Date     09/24/2024    K 4.6 09/24/2024     09/24/2024    CO2 30 09/24/2024    BUN 18 09/24/2024    CREATININE 0.94 09/24/2024     CALCIUM 9.5 09/24/2024        LFTs:   Lab Results   Component Value Date    PROT 6.8 09/24/2024    ALBUMIN 4.5 09/24/2024    BILITOT 0.7 09/24/2024    ALKPHOS 87 09/24/2024    AST 19 09/24/2024    ALT 20 09/24/2024      Pathology: Normal without evidence of H. pylori or metaplasia      Imaging: I have personally reviewed the images and the radiologist's report.  EGD: 3 cm hiatal hernia    Manometry: Normal esophageal motility with a hiatal hernia    Bravo: Reflux with a DeMeester score of 30    Assessment:  This is a 65 y.o.-year-old female who presents for a persistent refractory hiatal hernia.  We discussed that her symptoms remain classic for reflux disease from a hiatal hernia, and it is refractory to pantoprazole 40 mg twice daily.  We discussed that I therefore would recommend a laparoscopic hiatal hernia repair with Toupet fundoplication.      Plan:  -- We will plan for laparoscopic hiatal hernia repair with Toupet fundoplication.  -- We will plan for surgery to be performed with 1 night in the hospital  -- We will obtain preoperative labwork including CBC, RFP, Magnesium, LFTs, INR, and Type and Screen.  -- We will obtain a preoperative chest X-ray and EKG.  -- We will obtain Preadmission Testing (PAT).  -- We will apply Dermabond, so the patient may shower the day after surgery. No swimming or tub soaks for 2 weeks post-operatively.  -- No heavy lifting for 6 weeks after surgery.      Saman Anaya MD  General Surgery  Office: [phone]  Fax: [phone]  12:04 PM  04/11/25        Past Medical History:  Past Medical History:   Diagnosis Date    Anxiety     Colon cancer screening 01/27/2025    Daytime somnolence     Deviated nasal septum     GERD (gastroesophageal reflux disease)     Hiatal hernia     Hyperlipidemia     Hypertension     Left ovarian cyst     Lung nodules     DAMIÁN (obstructive sleep apnea)     Pain in female pelvis     Palpitations     Thyroid nodule         Past Surgical  History:  Past Surgical History:   Procedure Laterality Date    SHOULDER SURGERY  01/17/2019        Medications:  Current Outpatient Medications   Medication Instructions    cholecalciferol (Vitamin D-3) 50 MCG (2000 UT) tablet 1 tablet, 3 times weekly    irbesartan (AVAPRO) 150 mg, oral, Daily    LORazepam (ATIVAN) 1 mg, Every 12 hours PRN    pantoprazole (PROTONIX) 40 mg, oral, 2 times daily, Do not crush, chew, or split. / 2 tablets daily    PARoxetine (Paxil) 20 mg tablet 1 tablet, Daily        Allergies:  Allergies   Allergen Reactions    Adhesive Tape-Silicones Rash        Family History:  Family History   Problem Relation Name Age of Onset    Hyperlipidemia Mother      Coronary artery disease Father      Hyperlipidemia Sister      Hyperlipidemia Brother          Social History:  Social History     Socioeconomic History    Marital status: Single   Tobacco Use    Smoking status: Never    Smokeless tobacco: Never   Vaping Use    Vaping status: Never Used   Substance and Sexual Activity    Alcohol use: Not Currently    Drug use: Never        Review of Systems:  A complete 12 point review of systems was performed and is negative except as noted in the history of present illness.

## 2025-04-14 ENCOUNTER — HOSPITAL ENCOUNTER (OUTPATIENT)
Facility: HOSPITAL | Age: 65
Setting detail: OUTPATIENT SURGERY
End: 2025-04-14
Attending: SURGERY | Admitting: SURGERY
Payer: MEDICARE

## 2025-04-14 DIAGNOSIS — K44.9 HIATAL HERNIA WITH GASTROESOPHAGEAL REFLUX: Primary | ICD-10-CM

## 2025-04-14 DIAGNOSIS — K21.9 HIATAL HERNIA WITH GASTROESOPHAGEAL REFLUX: Primary | ICD-10-CM

## 2025-04-22 ENCOUNTER — PRE-ADMISSION TESTING (OUTPATIENT)
Dept: PREADMISSION TESTING | Facility: HOSPITAL | Age: 65
End: 2025-04-22
Payer: MEDICARE

## 2025-04-22 ENCOUNTER — TELEPHONE (OUTPATIENT)
Dept: SURGERY | Facility: CLINIC | Age: 65
End: 2025-04-22
Payer: MEDICARE

## 2025-04-22 NOTE — TELEPHONE ENCOUNTER
Received a call from [facility] patient did not show for her appointment for 4-22 at 9:30am-They called the patient and she stated that her mother past away and it is to much for her  Right now. They did tell her to call Dr. Jaclyn bustamante. Dr. Jaclyn bustamante reached out to patient, but phone call got disconnected when trying to speak to patient.  Dr. Jaclyn bustamante cancelled surgery for 5-6 for her hiatal hernia repair. Patient will be able to call in future to reset everything up once again when she is ready if in reasonable amount of time.

## 2025-05-29 ENCOUNTER — APPOINTMENT (OUTPATIENT)
Dept: PAIN MEDICINE | Facility: CLINIC | Age: 65
End: 2025-05-29
Payer: MEDICARE

## 2025-06-05 ENCOUNTER — HOSPITAL ENCOUNTER (OUTPATIENT)
Dept: RADIOLOGY | Facility: HOSPITAL | Age: 65
Discharge: HOME | End: 2025-06-05
Payer: MEDICARE

## 2025-06-05 DIAGNOSIS — R94.31 ABNORMAL EKG: ICD-10-CM

## 2025-06-05 PROCEDURE — 75571 CT HRT W/O DYE W/CA TEST: CPT

## 2025-06-09 DIAGNOSIS — I10 HYPERTENSION, UNSPECIFIED TYPE: ICD-10-CM

## 2025-06-09 RX ORDER — IRBESARTAN 150 MG/1
150 TABLET ORAL DAILY
Qty: 100 TABLET | Refills: 1 | Status: SHIPPED | OUTPATIENT
Start: 2025-06-09

## 2025-06-09 NOTE — TELEPHONE ENCOUNTER
Patient needs refills on Irbesartan 150mg takes one a day #90  [place] [facility] [place]

## 2025-06-10 ENCOUNTER — TELEPHONE (OUTPATIENT)
Dept: CARDIOLOGY | Facility: CLINIC | Age: 65
End: 2025-06-10
Payer: MEDICARE

## 2025-06-10 DIAGNOSIS — Z12.31 ENCOUNTER FOR SCREENING MAMMOGRAM FOR BREAST CANCER: ICD-10-CM

## 2025-06-10 NOTE — TELEPHONE ENCOUNTER
"Result Communication    Resulted Orders   CT cardiac scoring wo IV contrast    Narrative    Interpreted By:  Bernardo Paula,   STUDY:  CT CARDIAC SCORING WO IV CONTRAST;  6/5/2025 8:10 am      INDICATION:  Signs/Symptoms:hpl      ,R94.31 Abnormal electrocardiogram (ECG) (EKG)      COMPARISON:  None.      ACCESSION NUMBER(S):  [id number]      ORDERING CLINICIAN:  BETTY BARTLETT      TECHNIQUE:  Using prospective ECG gating, CT scan of the coronary arteries was  performed without intravenous contrast. Coronary calcium scoring  was  performed according to the method of Agatston.      FINDINGS:  The calcium score in the coronary arteries is as follows:      LM 0  LAD 0  LCx 0  RCA 0      Total 0      The visualized mid/lower ascending thoracic aorta measures 3.2 cm in  diameter. The heart is normal in size. No pericardial effusion is  present.      No gross evidence of mediastinal or hilar lymphadenopathy or masses  is identified. The visualized segments of the lungs are normally  expanded.      The visualized subdiaphragmatic structures appear intact.        Impression    Coronary artery calcium score of 0*.      *Coronary artery calcium scoring may be helpful in predicting the  risk for future coronary heart disease events.  According to the  American College of Cardiology Foundation Clinical Expert Consensus  Task Force, such testing provides important prognostic information in  patients with more than one coronary heart disease risk factor. The  coronary artery calcium score correlates with the annual risk of a  non-fatal myocardial infarction or coronary heart disease death.      Coronary artery score            Annual Risk      0-99                             0.4%  100-399                        1.3%  >400                            2.4%      These three \"breakpoints\" correspond to lower, intermediate and high  risk states for future coronary events.  Such information should be  used, along with appropriate " "clinical judgment, to make decisions  regarding the intensity of risk factor management strategies to treat  blood lipids and to modify other non-lipid coronary risk factors.      Reference: Josep P et al. Circulation.  2007; 115:402-426      MACRO:  None      Signed by: Bernardo Paula 6/6/2025 7:36 AM  Dictation workstation:   [id number]       1:43 PM    per Bessy MOROCHO CNP called patient left message with results.  Good news.  Cardiac score was zero. Please review results on \"mychart\"  "

## 2025-06-10 NOTE — TELEPHONE ENCOUNTER
----- Message from Bessy Stapleton sent at 6/10/2025 11:57 AM EDT -----  Please call patient with results.  Good news.  Cardiac score was zero.  ----- Message -----  From: Interface, Radiology Results In  Sent: 6/6/2025   7:38 AM EDT  To: ERIN Renteria-CNP

## 2025-06-19 DIAGNOSIS — K21.9 GASTROESOPHAGEAL REFLUX DISEASE, UNSPECIFIED WHETHER ESOPHAGITIS PRESENT: ICD-10-CM

## 2025-06-19 DIAGNOSIS — K44.9 HIATAL HERNIA: ICD-10-CM

## 2025-06-19 RX ORDER — PANTOPRAZOLE SODIUM 40 MG/1
40 TABLET, DELAYED RELEASE ORAL 2 TIMES DAILY
Qty: 180 TABLET | Refills: 1 | Status: SHIPPED | OUTPATIENT
Start: 2025-06-19 | End: 2025-12-16

## 2025-07-15 ENCOUNTER — PATIENT MESSAGE (OUTPATIENT)
Dept: CARE COORDINATION | Facility: CLINIC | Age: 65
End: 2025-07-15
Payer: MEDICARE

## 2025-07-30 ENCOUNTER — APPOINTMENT (OUTPATIENT)
Dept: CARDIOLOGY | Facility: CLINIC | Age: 65
End: 2025-07-30
Payer: MEDICARE

## 2025-08-01 ENCOUNTER — APPOINTMENT (OUTPATIENT)
Dept: PRIMARY CARE | Facility: CLINIC | Age: 65
End: 2025-08-01
Payer: MEDICARE

## 2025-08-14 ENCOUNTER — APPOINTMENT (OUTPATIENT)
Dept: OBSTETRICS AND GYNECOLOGY | Facility: CLINIC | Age: 65
End: 2025-08-14
Payer: MEDICARE

## 2025-09-04 ENCOUNTER — APPOINTMENT (OUTPATIENT)
Facility: CLINIC | Age: 65
End: 2025-09-04
Payer: MEDICARE

## 2025-09-04 ENCOUNTER — OFFICE VISIT (OUTPATIENT)
Facility: CLINIC | Age: 65
End: 2025-09-04
Payer: MEDICARE

## 2025-09-04 VITALS
SYSTOLIC BLOOD PRESSURE: 122 MMHG | HEART RATE: 71 BPM | HEIGHT: 66 IN | DIASTOLIC BLOOD PRESSURE: 72 MMHG | BODY MASS INDEX: 26.68 KG/M2 | OXYGEN SATURATION: 100 % | RESPIRATION RATE: 16 BRPM | WEIGHT: 166 LBS

## 2025-09-04 DIAGNOSIS — E55.9 VITAMIN D DEFICIENCY: ICD-10-CM

## 2025-09-04 DIAGNOSIS — Z11.59 NEED FOR HEPATITIS C SCREENING TEST: ICD-10-CM

## 2025-09-04 DIAGNOSIS — F41.9 ANXIETY: ICD-10-CM

## 2025-09-04 DIAGNOSIS — I10 BENIGN ESSENTIAL HTN: Primary | ICD-10-CM

## 2025-09-04 DIAGNOSIS — K21.9 GASTROESOPHAGEAL REFLUX DISEASE, UNSPECIFIED WHETHER ESOPHAGITIS PRESENT: ICD-10-CM

## 2025-09-04 DIAGNOSIS — K21.9 HIATAL HERNIA WITH GASTROESOPHAGEAL REFLUX: ICD-10-CM

## 2025-09-04 DIAGNOSIS — K44.9 HIATAL HERNIA WITH GASTROESOPHAGEAL REFLUX: ICD-10-CM

## 2025-09-04 DIAGNOSIS — Z00.00 ROUTINE GENERAL MEDICAL EXAMINATION AT HEALTH CARE FACILITY: ICD-10-CM

## 2025-09-04 DIAGNOSIS — E04.1 THYROID NODULE: ICD-10-CM

## 2025-09-04 DIAGNOSIS — R73.01 ELEVATED FASTING GLUCOSE: ICD-10-CM

## 2025-09-04 DIAGNOSIS — K44.9 HIATAL HERNIA: ICD-10-CM

## 2025-09-04 DIAGNOSIS — E78.2 MIXED HYPERLIPIDEMIA: ICD-10-CM

## 2025-09-04 DIAGNOSIS — Z78.0 POSTMENOPAUSAL ESTROGEN DEFICIENCY: ICD-10-CM

## 2025-09-04 DIAGNOSIS — M48.061 LUMBAR STENOSIS WITHOUT NEUROGENIC CLAUDICATION: ICD-10-CM

## 2025-09-04 DIAGNOSIS — M85.89 OSTEOPENIA OF MULTIPLE SITES: ICD-10-CM

## 2025-09-04 DIAGNOSIS — K21.9 GASTROESOPHAGEAL REFLUX DISEASE WITHOUT ESOPHAGITIS: ICD-10-CM

## 2025-09-04 DIAGNOSIS — Z12.31 ENCOUNTER FOR SCREENING MAMMOGRAM FOR MALIGNANT NEOPLASM OF BREAST: ICD-10-CM

## 2025-09-04 DIAGNOSIS — I10 HYPERTENSION, UNSPECIFIED TYPE: ICD-10-CM

## 2025-09-04 PROCEDURE — G0439 PPPS, SUBSEQ VISIT: HCPCS

## 2025-09-04 PROCEDURE — 1126F AMNT PAIN NOTED NONE PRSNT: CPT

## 2025-09-04 PROCEDURE — 1159F MED LIST DOCD IN RCRD: CPT

## 2025-09-04 PROCEDURE — 3078F DIAST BP <80 MM HG: CPT

## 2025-09-04 PROCEDURE — 3008F BODY MASS INDEX DOCD: CPT

## 2025-09-04 PROCEDURE — 3074F SYST BP LT 130 MM HG: CPT

## 2025-09-04 RX ORDER — IRBESARTAN 150 MG/1
150 TABLET ORAL DAILY
Qty: 90 TABLET | Refills: 3 | Status: SHIPPED | OUTPATIENT
Start: 2025-09-04 | End: 2026-09-04

## 2025-09-04 RX ORDER — PANTOPRAZOLE SODIUM 40 MG/1
40 TABLET, DELAYED RELEASE ORAL 2 TIMES DAILY
Qty: 180 TABLET | Refills: 3 | Status: SHIPPED | OUTPATIENT
Start: 2025-09-04 | End: 2026-09-04

## 2025-09-04 ASSESSMENT — ENCOUNTER SYMPTOMS
OCCASIONAL FEELINGS OF UNSTEADINESS: 0
LOSS OF SENSATION IN FEET: 0
DEPRESSION: 0

## 2025-09-04 ASSESSMENT — PATIENT HEALTH QUESTIONNAIRE - PHQ9
SUM OF ALL RESPONSES TO PHQ9 QUESTIONS 1 & 2: 0
1. LITTLE INTEREST OR PLEASURE IN DOING THINGS: NOT AT ALL
2. FEELING DOWN, DEPRESSED OR HOPELESS: NOT AT ALL

## 2025-09-04 ASSESSMENT — PAIN SCALES - GENERAL: PAINLEVEL_OUTOF10: 0-NO PAIN

## (undated) DEVICE — SCISSOR, MINI ENDO CUT, TIPS, DISP

## (undated) DEVICE — DRAPE, SHEET, FAN FOLDED, HALF, 44 X 58 IN, DISPOSABLE, LF, STERILE

## (undated) DEVICE — ASSEMBLY, STRYKER FLOW 2, SUCTION IRRIGATOR, WITH TIP

## (undated) DEVICE — SOLUTION, IRRIGATION, SODIUM CHLORIDE 0.9%, 1000 ML, POUR BOTTLE

## (undated) DEVICE — PAD, GROUNDING, ELECTROSURGICAL, W/9 FT CABLE, POLYHESIVE II, ADULT, LF

## (undated) DEVICE — Device

## (undated) DEVICE — CABLE, ELECTROSURGICAL, MONOPOLAR, LAPAROSCOPIC, 10 FT, LF

## (undated) DEVICE — SUTURE, VICRYL, 4-0, 18 IN, PS2, UNDYED

## (undated) DEVICE — MARKER, SKIN, DUAL TIP INK W/9 LABEL AND REMOVABLE TIME OUT SLEEVE

## (undated) DEVICE — TUBE SET, PNEUMOCLEAR, SMOKE EVACU, HIGH-FLOW

## (undated) DEVICE — NEEDLE, INSUFFLATION, 13GAX120MM, DISP

## (undated) DEVICE — CARE KIT, LAPAROSCOPIC, ADVANCED

## (undated) DEVICE — SUTURE, VICRYL PLUS, 0, 27IN, UR-6, VIOLET, BRAIDED

## (undated) DEVICE — LIGASURE, V SEALER/DIVIDER  5MM BLUNT TIP